# Patient Record
Sex: FEMALE | Race: WHITE | NOT HISPANIC OR LATINO | Employment: FULL TIME | URBAN - METROPOLITAN AREA
[De-identification: names, ages, dates, MRNs, and addresses within clinical notes are randomized per-mention and may not be internally consistent; named-entity substitution may affect disease eponyms.]

---

## 2023-02-27 LAB
ALANINE AMINOTRANSFERASE (SGPT) (U/L) IN SER/PLAS: 14 U/L (ref 7–45)
ALBUMIN (G/DL) IN SER/PLAS: 4.3 G/DL (ref 3.4–5)
ALKALINE PHOSPHATASE (U/L) IN SER/PLAS: 55 U/L (ref 33–110)
ANION GAP IN SER/PLAS: 11 MMOL/L (ref 10–20)
ASPARTATE AMINOTRANSFERASE (SGOT) (U/L) IN SER/PLAS: 15 U/L (ref 9–39)
BASOPHILS (10*3/UL) IN BLOOD BY AUTOMATED COUNT: 0.07 X10E9/L (ref 0–0.1)
BASOPHILS/100 LEUKOCYTES IN BLOOD BY AUTOMATED COUNT: 0.8 % (ref 0–2)
BILIRUBIN TOTAL (MG/DL) IN SER/PLAS: 0.4 MG/DL (ref 0–1.2)
CALCIUM (MG/DL) IN SER/PLAS: 9 MG/DL (ref 8.6–10.6)
CARBON DIOXIDE, TOTAL (MMOL/L) IN SER/PLAS: 26 MMOL/L (ref 21–32)
CHLORIDE (MMOL/L) IN SER/PLAS: 109 MMOL/L (ref 98–107)
CORTISOL (UG/DL) IN SERUM: 10.1 UG/DL (ref 2.5–20)
CREATININE (MG/DL) IN SER/PLAS: 0.83 MG/DL (ref 0.5–1.05)
EOSINOPHILS (10*3/UL) IN BLOOD BY AUTOMATED COUNT: 0.34 X10E9/L (ref 0–0.7)
EOSINOPHILS/100 LEUKOCYTES IN BLOOD BY AUTOMATED COUNT: 4.1 % (ref 0–6)
ERYTHROCYTE DISTRIBUTION WIDTH (RATIO) BY AUTOMATED COUNT: 13.1 % (ref 11.5–14.5)
ERYTHROCYTE MEAN CORPUSCULAR HEMOGLOBIN CONCENTRATION (G/DL) BY AUTOMATED: 33 G/DL (ref 32–36)
ERYTHROCYTE MEAN CORPUSCULAR VOLUME (FL) BY AUTOMATED COUNT: 103 FL (ref 80–100)
ERYTHROCYTES (10*6/UL) IN BLOOD BY AUTOMATED COUNT: 4.06 X10E12/L (ref 4–5.2)
GFR FEMALE: 89 ML/MIN/1.73M2
GLUCOSE (MG/DL) IN SER/PLAS: 96 MG/DL (ref 74–99)
HEMATOCRIT (%) IN BLOOD BY AUTOMATED COUNT: 41.8 % (ref 36–46)
HEMOGLOBIN (G/DL) IN BLOOD: 13.8 G/DL (ref 12–16)
IMMATURE GRANULOCYTES/100 LEUKOCYTES IN BLOOD BY AUTOMATED COUNT: 0.4 % (ref 0–0.9)
LACTATE DEHYDROGENASE (U/L) IN SER/PLAS BY LAC->PYR RXN: 136 U/L (ref 84–246)
LEUKOCYTES (10*3/UL) IN BLOOD BY AUTOMATED COUNT: 8.4 X10E9/L (ref 4.4–11.3)
LYMPHOCYTES (10*3/UL) IN BLOOD BY AUTOMATED COUNT: 2.36 X10E9/L (ref 1.2–4.8)
LYMPHOCYTES/100 LEUKOCYTES IN BLOOD BY AUTOMATED COUNT: 28.3 % (ref 13–44)
MONOCYTES (10*3/UL) IN BLOOD BY AUTOMATED COUNT: 0.71 X10E9/L (ref 0.1–1)
MONOCYTES/100 LEUKOCYTES IN BLOOD BY AUTOMATED COUNT: 8.5 % (ref 2–10)
NEUTROPHILS (10*3/UL) IN BLOOD BY AUTOMATED COUNT: 4.84 X10E9/L (ref 1.2–7.7)
NEUTROPHILS/100 LEUKOCYTES IN BLOOD BY AUTOMATED COUNT: 57.9 % (ref 40–80)
NRBC (PER 100 WBCS) BY AUTOMATED COUNT: 0 /100 WBC (ref 0–0)
PLATELETS (10*3/UL) IN BLOOD AUTOMATED COUNT: 292 X10E9/L (ref 150–450)
POTASSIUM (MMOL/L) IN SER/PLAS: 4.9 MMOL/L (ref 3.5–5.3)
PROTEIN TOTAL: 6.4 G/DL (ref 6.4–8.2)
SODIUM (MMOL/L) IN SER/PLAS: 141 MMOL/L (ref 136–145)
THYROTROPIN (MIU/L) IN SER/PLAS BY DETECTION LIMIT <= 0.05 MIU/L: 2.49 MIU/L (ref 0.44–3.98)
UREA NITROGEN (MG/DL) IN SER/PLAS: 16 MG/DL (ref 6–23)

## 2023-03-02 LAB — ADRENOCORTICOTROPIC HORMONE: 10.7 PG/ML (ref 7.2–63.3)

## 2023-08-24 PROBLEM — F41.8 DEPRESSION WITH ANXIETY: Status: ACTIVE | Noted: 2023-08-24

## 2023-08-24 PROBLEM — G47.00 INSOMNIA: Status: ACTIVE | Noted: 2023-08-24

## 2023-08-24 PROBLEM — R55 SYNCOPE: Status: ACTIVE | Noted: 2018-05-29

## 2023-08-24 PROBLEM — M12.819 ROTATOR CUFF ARTHROPATHY: Status: ACTIVE | Noted: 2018-05-29

## 2023-08-24 PROBLEM — F32.9 MDD (MAJOR DEPRESSIVE DISORDER): Status: ACTIVE | Noted: 2023-08-24

## 2023-08-24 PROBLEM — G43.119 MIGRAINE WITH AURA, INTRACTABLE: Status: ACTIVE | Noted: 2023-08-24

## 2023-08-24 PROBLEM — N63.0 BREAST MASS: Status: ACTIVE | Noted: 2022-11-19

## 2023-08-24 PROBLEM — C43.59 MELANOMA OF BACK (MULTI): Status: ACTIVE | Noted: 2021-12-29

## 2023-08-24 PROBLEM — F41.8 DEPRESSION WITH ANXIETY: Status: RESOLVED | Noted: 2023-08-24 | Resolved: 2023-08-24

## 2023-08-24 PROBLEM — M54.2 CERVICALGIA: Status: ACTIVE | Noted: 2019-07-29

## 2023-08-24 PROBLEM — R92.8 ABNORMAL MAMMOGRAM: Status: ACTIVE | Noted: 2022-08-12

## 2023-08-24 PROBLEM — F41.9 ANXIETY: Status: ACTIVE | Noted: 2018-05-29

## 2023-08-24 RX ORDER — MUPIROCIN 20 MG/G
OINTMENT TOPICAL
COMMUNITY
End: 2023-10-03 | Stop reason: ALTCHOICE

## 2023-08-24 RX ORDER — VARENICLINE TARTRATE 0.5 MG/1
1 TABLET, FILM COATED ORAL DAILY
COMMUNITY
End: 2023-10-03

## 2023-08-24 RX ORDER — ATENOLOL 50 MG/1
50 TABLET ORAL DAILY
COMMUNITY
Start: 2023-04-05 | End: 2023-10-03 | Stop reason: ALTCHOICE

## 2023-08-24 RX ORDER — NAPROXEN SODIUM 220 MG
TABLET ORAL
COMMUNITY
End: 2023-10-03 | Stop reason: ALTCHOICE

## 2023-08-24 RX ORDER — LORAZEPAM 0.5 MG/1
0.5 TABLET ORAL EVERY 6 HOURS PRN
COMMUNITY
End: 2023-12-11 | Stop reason: ALTCHOICE

## 2023-08-24 RX ORDER — NICOTINE 7MG/24HR
1 PATCH, TRANSDERMAL 24 HOURS TRANSDERMAL
COMMUNITY
Start: 2022-01-18 | End: 2023-12-11 | Stop reason: ALTCHOICE

## 2023-08-24 RX ORDER — OMEPRAZOLE 40 MG/1
40 CAPSULE, DELAYED RELEASE ORAL
COMMUNITY
Start: 2021-12-17 | End: 2023-12-11 | Stop reason: ALTCHOICE

## 2023-08-24 RX ORDER — MIRTAZAPINE 45 MG/1
45 TABLET, FILM COATED ORAL NIGHTLY
COMMUNITY
End: 2023-10-01 | Stop reason: SDUPTHER

## 2023-08-24 RX ORDER — BUPROPION HYDROCHLORIDE 100 MG/1
100 TABLET ORAL
COMMUNITY
Start: 2021-11-17 | End: 2023-10-03

## 2023-08-24 RX ORDER — TRAMADOL HYDROCHLORIDE 50 MG/1
50 TABLET ORAL EVERY 12 HOURS
COMMUNITY
Start: 2022-01-18 | End: 2023-10-03 | Stop reason: ALTCHOICE

## 2023-08-25 PROBLEM — D22.5 ATYPICAL NEVUS OF BACK: Status: ACTIVE | Noted: 2023-08-25

## 2023-08-25 PROBLEM — R10.11 RIGHT UPPER QUADRANT PAIN: Status: ACTIVE | Noted: 2023-08-25

## 2023-08-25 PROBLEM — C44.92 SCC (SQUAMOUS CELL CARCINOMA): Status: ACTIVE | Noted: 2023-08-25

## 2023-08-29 ENCOUNTER — HOSPITAL ENCOUNTER (OUTPATIENT)
Dept: DATA CONVERSION | Facility: HOSPITAL | Age: 43
Discharge: HOME | End: 2023-08-29
Payer: COMMERCIAL

## 2023-08-29 DIAGNOSIS — Z01.419 ENCOUNTER FOR GYNECOLOGICAL EXAMINATION (GENERAL) (ROUTINE) WITHOUT ABNORMAL FINDINGS: ICD-10-CM

## 2023-08-29 DIAGNOSIS — Z11.51 ENCOUNTER FOR SCREENING FOR HUMAN PAPILLOMAVIRUS (HPV): ICD-10-CM

## 2023-09-18 ENCOUNTER — HOSPITAL ENCOUNTER (OUTPATIENT)
Dept: DATA CONVERSION | Facility: HOSPITAL | Age: 43
Discharge: HOME | End: 2023-09-18
Payer: COMMERCIAL

## 2023-09-18 DIAGNOSIS — N93.9 ABNORMAL UTERINE AND VAGINAL BLEEDING, UNSPECIFIED: ICD-10-CM

## 2023-10-01 DIAGNOSIS — F33.1 MODERATE EPISODE OF RECURRENT MAJOR DEPRESSIVE DISORDER (MULTI): ICD-10-CM

## 2023-10-01 RX ORDER — MIRTAZAPINE 45 MG/1
45 TABLET, FILM COATED ORAL NIGHTLY
Qty: 30 TABLET | Refills: 0 | Status: SHIPPED | OUTPATIENT
Start: 2023-10-01 | End: 2023-11-01 | Stop reason: SDUPTHER

## 2023-10-03 ENCOUNTER — TELEMEDICINE (OUTPATIENT)
Dept: BEHAVIORAL HEALTH | Facility: HOSPITAL | Age: 43
End: 2023-10-03
Payer: COMMERCIAL

## 2023-10-03 DIAGNOSIS — F33.1 MODERATE EPISODE OF RECURRENT MAJOR DEPRESSIVE DISORDER (MULTI): ICD-10-CM

## 2023-10-03 PROCEDURE — 99213 OFFICE O/P EST LOW 20 MIN: CPT | Mod: GT,95 | Performed by: PSYCHIATRY & NEUROLOGY

## 2023-10-03 PROCEDURE — 99213 OFFICE O/P EST LOW 20 MIN: CPT | Performed by: PSYCHIATRY & NEUROLOGY

## 2023-10-03 RX ORDER — LOSARTAN POTASSIUM 100 MG/1
100 TABLET ORAL DAILY
COMMUNITY
Start: 2023-09-05 | End: 2023-12-19 | Stop reason: SDUPTHER

## 2023-10-03 RX ORDER — IBUPROFEN 200 MG
1 TABLET ORAL DAILY
COMMUNITY
Start: 2023-02-22 | End: 2023-12-11 | Stop reason: ALTCHOICE

## 2023-10-03 NOTE — PROGRESS NOTES
Patient  Melina Rivera is a 43 y.o. female, presented for a follow-up for   Chief Complaint   Patient presents with    Anxiety    Depression   .    Patient was seen via virtual visit:   An interactive audio and video telecommunication system which permits real time communications between the patient (at the originating site) and provider (at the distant site) was utilized to provide this telehealth service.   Verbal consent was requested and obtained from Melina Rivera on this date, 10/03/23 for a telehealth visit.       History of presenting Illness:  43 yr old  woman with stage IIIB melanoma. BRAF positive, s/p one year of adjuvant treatment with immunotherapy/Nivolumab, CT scan on 01/03/2023 showing stable changes who is referred to psychiatry for depression and anxiety.      Patient reports that she had ran out of her mirtazapine and was off for a week. She felt much more anxious and depressed, reports that she took lorazepam 0.5mg twice a day for a few days. Now that she is back on miratzapine, she feels much better. Plans on keep on taking her medication. She is sleeping better, eating better and her mood is better. Reports not feeling helpless. Has had intermittent crying but denies crying spontaneously. Her relationship with her siblings is improved. Has been able to communicate with them better. She is able to acknowledge her emotions.     Denies manic or panic symptoms. No psychosis.   Denies gun ownership.       Past Psychiatric History:   Previous therapy: yes  Previous psychiatric treatment and medication trials: yes - wellbutrin, lorazepam, mirtazapine  Previous psychiatric hospitalizations: yes - once in 2011 when had an anger outburst, was admitted at [facility] for a few days.  Previous diagnoses: yes - MDD, PEPE  Previous suicide attempts: no  History of violence: no  Depression screening was performed with standardized tool: Not Screened    Past Medical History  He has no past  medical history on file.  There is no problem list on file for this patient.      Surgical History  He has no past surgical history on file.     Social History  He has no history on file for tobacco use, alcohol use, and drug use.     Allergies  Patient has no allergy information on record.    Review of Systems    Psychiatric ROS - Adult  Anxiety: General Anxiety Disorder (PEPE)PEPE Behaviors: excessive anxiety/worry, irritability, and restlessness  Depression: negative, energy, interest, and sleep increased  Delirium: negative  Psychosis: negative  Danya: negative  Safety Issues: none  Psychiatric ROS Comment: none    Medication:    Current Outpatient Medications:     losartan (Cozaar) 100 mg tablet, Take 1 tablet (100 mg) by mouth once daily., Disp: , Rfl:     nicotine (Nicoderm CQ) 21 mg/24 hr patch, Place 1 patch on the skin once daily. Apply (1) patch daily as directed., Disp: , Rfl:     LORazepam (Ativan) 0.5 mg tablet, Take 1 tablet (0.5 mg) by mouth 2 times a day., Disp: , Rfl:     mirtazapine (Remeron) 45 mg tablet, Take 1 tablet (45 mg) by mouth once daily at bedtime., Disp: 30 tablet, Rfl: 0    nicotine (Nicoderm CQ) 7 mg/24 hr patch, Place 1 patch on the skin once daily., Disp: , Rfl:     omeprazole (PriLOSEC) 40 mg DR capsule, Take 1 capsule (40 mg) by mouth., Disp: , Rfl:        Allergies:  No Known Allergies     Vitals:  There were no vitals taken for this visit.     Mental Status Exam  Appearance: well-groomed.   Build: average.   Demeanor: average.   Eye Contact: average.   Motor Activity: average.   Speech: clear.   Language: Neurologic language is intact.   Fund of Knowledge: intact fund of knowledge.   Delusions: None Reported.   Self Harm: None Reported.   Aggressive: None Reported.   Mood: depressed and anxious.   Affect: full.   Orientation: alert.   Manner: cooperative.   Thought process: goal-directed, logical.   Thought association: displays rational thought process.   Content of thought: As  noted in HPI   Abstract/ Rational Thought: intact   Memory: grossly intact.   Cognition: intact.   Insight: intact.   Judgement: intact.     Psychiatric Risk Assessment  Violence Risk Assessment: none  Acute Risk of Harm to Others is Considered: low   Suicide Risk Assessment: none  Protective Factors against Suicide: adherence to  treatment, employment, hopefulness/future orientation, marriage/partnership, positive family relationships, and Mosque affiliation/spirituality  Acute Risk of Harm to Self is Considered: low    Labs:  No results found for this or any previous visit (from the past 96 hour(s)).     Assessment/Plan       43 yr old  woman with stage IIIB melanoma. BRAF positive, s/p one year of adjuvant treatment with immunotherapy/Nivolumab, CT scan on 01/03/2023 showing stable changes who is referred to psychiatry for depression and anxiety. Presents with sxs c/w MDD with anxious distress in setting of multiple psychoscocial stressors.      She was started on mirtazapine, she is doing better. Has some relief in her depression and anxiety.     PLAN:  1. MDD with anxious distress  - Increase mirtazapine to 30mg PO QHS  - Provided supportive therapy  - No acute safety issues.     f/u in 4 weeks.        I spent 20 minutes in the professional and overall care of this patient.      Medication Consent  Medication Consent: risks, benefits, side effects reviewed for all ordered meds    Santana Trinh MD

## 2023-10-10 DIAGNOSIS — C43.59 MELANOMA OF BACK (MULTI): Primary | ICD-10-CM

## 2023-10-10 DIAGNOSIS — Z92.89 HISTORY OF IMMUNOTHERAPY: ICD-10-CM

## 2023-10-13 ENCOUNTER — APPOINTMENT (OUTPATIENT)
Dept: RADIOLOGY | Facility: CLINIC | Age: 43
End: 2023-10-13
Payer: COMMERCIAL

## 2023-10-13 ENCOUNTER — APPOINTMENT (OUTPATIENT)
Dept: LAB | Facility: CLINIC | Age: 43
End: 2023-10-13
Payer: COMMERCIAL

## 2023-10-18 ENCOUNTER — APPOINTMENT (OUTPATIENT)
Dept: HEMATOLOGY/ONCOLOGY | Facility: CLINIC | Age: 43
End: 2023-10-18
Payer: COMMERCIAL

## 2023-10-27 ENCOUNTER — APPOINTMENT (OUTPATIENT)
Dept: HEMATOLOGY/ONCOLOGY | Facility: CLINIC | Age: 43
End: 2023-10-27
Payer: COMMERCIAL

## 2023-10-31 ENCOUNTER — TELEMEDICINE (OUTPATIENT)
Dept: BEHAVIORAL HEALTH | Facility: HOSPITAL | Age: 43
End: 2023-10-31
Payer: COMMERCIAL

## 2023-10-31 DIAGNOSIS — F33.1 MODERATE EPISODE OF RECURRENT MAJOR DEPRESSIVE DISORDER (MULTI): ICD-10-CM

## 2023-10-31 DIAGNOSIS — F41.9 ANXIETY: ICD-10-CM

## 2023-10-31 DIAGNOSIS — F33.42 RECURRENT MAJOR DEPRESSIVE DISORDER, IN FULL REMISSION (CMS-HCC): ICD-10-CM

## 2023-10-31 PROCEDURE — 99213 OFFICE O/P EST LOW 20 MIN: CPT | Performed by: PSYCHIATRY & NEUROLOGY

## 2023-11-01 ENCOUNTER — LAB (OUTPATIENT)
Dept: LAB | Facility: CLINIC | Age: 43
End: 2023-11-01
Payer: COMMERCIAL

## 2023-11-01 ENCOUNTER — ANCILLARY PROCEDURE (OUTPATIENT)
Dept: RADIOLOGY | Facility: CLINIC | Age: 43
End: 2023-11-01
Payer: COMMERCIAL

## 2023-11-01 DIAGNOSIS — C43.9 MALIGNANT MELANOMA OF SKIN, UNSPECIFIED (MULTI): Primary | ICD-10-CM

## 2023-11-01 DIAGNOSIS — C43.59 MALIGNANT MELANOMA OF OTHER PART OF TRUNK (MULTI): Primary | ICD-10-CM

## 2023-11-01 DIAGNOSIS — C43.59 MELANOMA OF BACK (MULTI): Primary | ICD-10-CM

## 2023-11-01 LAB
CREAT SERPL-MCNC: 0.8 MG/DL (ref 0.6–1.3)
GFR SERPL CREATININE-BSD FRML MDRD: >90 ML/MIN/1.73M*2

## 2023-11-01 PROCEDURE — 74177 CT ABD & PELVIS W/CONTRAST: CPT | Performed by: RADIOLOGY

## 2023-11-01 PROCEDURE — 71260 CT THORAX DX C+: CPT | Performed by: RADIOLOGY

## 2023-11-01 PROCEDURE — 36415 COLL VENOUS BLD VENIPUNCTURE: CPT

## 2023-11-01 PROCEDURE — 74177 CT ABD & PELVIS W/CONTRAST: CPT

## 2023-11-01 PROCEDURE — 82565 ASSAY OF CREATININE: CPT

## 2023-11-01 PROCEDURE — 2550000001 HC RX 255 CONTRASTS: Performed by: INTERNAL MEDICINE

## 2023-11-01 RX ORDER — MIRTAZAPINE 45 MG/1
45 TABLET, FILM COATED ORAL NIGHTLY
Qty: 30 TABLET | Refills: 2 | Status: SHIPPED | OUTPATIENT
Start: 2023-11-01 | End: 2024-01-24 | Stop reason: SDUPTHER

## 2023-11-01 RX ADMIN — IOHEXOL 75 ML: 350 INJECTION, SOLUTION INTRAVENOUS at 11:52

## 2023-11-01 NOTE — PROGRESS NOTES
Patient  Melina Rivera is a 43 y.o. female, presented for   Chief Complaint   Patient presents with    Follow-up    Depression    Anxiety   .    Patient was seen via virtual visit:   An interactive audio and video telecommunication system which permits real time communications between the patient (at the originating site) and provider (at the distant site) was utilized to provide this telehealth service.   Verbal consent was requested and obtained from Melina Rivera on this date, 11/01/23 for a telehealth visit.       History of presenting Illness:   43 yr old  woman with stage IIIB melanoma. BRAF positive, s/p one year of adjuvant treatment with immunotherapy/Nivolumab, CT scan on 01/03/2023 showing stable changes who is referred to psychiatry for depression and anxiety.     She is back on mirtazapine and feeling better. . She is sleeping better, eating better and her mood is better. Reports not feeling helpless. Has had intermittent crying but denies crying spontaneously. Her relationship with her siblings is improved. Has been able to communicate with them better. She is able to acknowledge her emotions.     Denies manic or panic symptoms. No psychosis.   Denies gun ownership.       Past Psychiatric History:   Previous therapy: yes  Previous psychiatric treatment and medication trials: yes - wellbutrin, lorazepam, mirtazapine  Previous psychiatric hospitalizations: yes - once in 2011 when had an anger outburst, was admitted at [facility] for a few days.  Previous diagnoses: yes - MDD, PEPE  Previous suicide attempts: no  History of violence: no  Depression screening was performed with standardized tool: Not Screened    Past Medical History  History reviewed. No pertinent past medical history.    Surgical History  Past Surgical History:   Procedure Laterality Date    OTHER SURGICAL HISTORY  11/29/2022    Knee surgery    OTHER SURGICAL HISTORY  11/29/2022    Excision melanoma        Family  History:  Family History   Problem Relation Name Age of Onset    Other (gist of colon) Mother      Other (cholangiocarcinoma) Father      Other (papillary carcinoma) Brother      Other (pten gene mutation) Brother      Other (b cell lymphona) Mother's Sister      Throat cancer Mother's Brother      Colon cancer Maternal Grandfather      Bilateral breast cancer Paternal Grandmother      Other (myeloid leukemia) Paternal Cousin         Social History:  Social History     Socioeconomic History    Marital status:      Spouse name: Not on file    Number of children: Not on file    Years of education: Not on file    Highest education level: Not on file   Occupational History    Not on file   Tobacco Use    Smoking status: Every Day     Packs/day: 0.50     Years: 15.00     Additional pack years: 0.00     Total pack years: 7.50     Types: Cigarettes    Smokeless tobacco: Never   Substance and Sexual Activity    Alcohol use: Not on file    Drug use: Not on file    Sexual activity: Not on file   Other Topics Concern    Not on file   Social History Narrative    Not on file     Social Determinants of Health     Financial Resource Strain: Not on file   Food Insecurity: Not on file   Transportation Needs: Not on file   Physical Activity: Not on file   Stress: Not on file   Social Connections: Not on file   Intimate Partner Violence: Not on file   Housing Stability: Not on file        Review of Systems  Anxiety: Negative  Depression: negative  Delirium: negative  Psychosis: negative  Danya: negative  Safety Issues: none  Psychiatric ROS Comment: None    Scales:       Medication:  Current Outpatient Medications   Medication Instructions    LORazepam (ATIVAN) 0.5 mg, oral, 2 times daily    losartan (COZAAR) 100 mg, oral, Daily    mirtazapine (REMERON) 45 mg, oral, Nightly    nicotine (Nicoderm CQ) 21 mg/24 hr patch 1 patch, transdermal, Daily, Apply (1) patch daily as directed.    nicotine (Nicoderm CQ) 7 mg/24 hr patch 1  "patch, transdermal, Daily RT    omeprazole (PRILOSEC) 40 mg, oral        Allergies  .No Known Allergies     Vitals:  Visit Vitals  Smoking Status Every Day        Mental Status Exam  General: Appears stated age, dressed appropriately  Appearance: Fair hygiene and grooming.  Attitude: Pleasant  Behavior: Cooperative  Motor Activity: WNL  Speech: Soft, normal rate, rhythm and volume.  Mood: \"good\"  Affect: Congruent  Thought Process: Linear and goal directed  Thought Content: Denies suicidal or homicidal ideas, intent or plans. No IOR or delusions.  Thought Perception: No perceptual abnormalities.  Cognition: Grossly intact  Insight: Intact  Judgement: Intact      Psychiatric Risk Assessment  Violence Risk Assessment: none  Acute Risk of Harm to Others is Considered: low   Suicide Risk Assessment:  and chronic medical illness  Protective Factors against Suicide: child-related concerns/living with children at home < 18 yrs, employment, fear of social disapproval, fear of suicide, hopefulness/future orientation, marriage/partnership, moral objections to suicide, positive family relationships, Protestant affiliation/spirituality, sense of responsibility toward family, social support/connectedness, strong coping skills, and strong therapeutic alliance with provider  Acute Risk of Harm to Self is Considered: low    Labs:  Component      Latest [facility] 4/3/2023   WBC      4.4 - 11.3 x10E9/L 10.8    RBC      4.00 - 5.20 x10E12/L 3.99 (L)    HEMOGLOBIN      12.0 - 16.0 g/dL 13.3    HEMATOCRIT      36.0 - 46.0 % 39.6    MCV      80 - 100 fL 99    MCHC      32.0 - 36.0 g/dL 33.6    Platelets      150 - 450 x10E9/L 275    RED CELL DISTRIBUTION WIDTH      11.5 - 14.5 % 13.2    Neutrophils %      40.0 - 80.0 % 58.8    Immature Granulocytes %, Automated      0.0 - 0.9 % 0.4    Lymphocytes %      13.0 - 44.0 % 31.3    Monocytes %      2.0 - 10.0 % 6.4    Eosinophils %      0.0 - 6.0 % 2.5    Basophils %      0.0 - 2.0 % 0.6  "   Neutrophils Absolute      1.20 - 7.70 x10E9/L 6.39    Lymphocytes Absolute      1.20 - 4.80 x10E9/L 3.39    Monocytes Absolute      0.10 - 1.00 x10E9/L 0.69    Eosinophils Absolute      0.00 - 0.70 x10E9/L 0.27    Basophils Absolute      0.00 - 0.10 x10E9/L 0.06    GLUCOSE      74 - 99 mg/dL 81    SODIUM      136 - 145 mmol/L 141    POTASSIUM      3.5 - 5.3 mmol/L 4.5    CHLORIDE      98 - 107 mmol/L 104    Bicarbonate      21 - 32 mmol/L 28    Anion Gap      10 - 20 mmol/L 14    Blood Urea Nitrogen      6 - 23 mg/dL 17    Creatinine      0.50 - 1.05 mg/dL 0.86    GFR Female      >90 mL/min/1.73m2 85    Calcium      8.6 - 10.6 mg/dL 9.7    Albumin      3.4 - 5.0 g/dL 4.3    Alkaline Phosphatase      33 - 110 U/L 46    Total Protein      6.4 - 8.2 g/dL 6.7    AST      9 - 39 U/L 15    Bilirubin Total      0.0 - 1.2 mg/dL 0.6    ALT      7 - 45 U/L 14    Thyroid Stimulating Hormone      0.44 - 3.98 mIU/L 1.79       Legend:  (L) Low    Diagnosis:  Problem List Items Addressed This Visit          Mental Health    Anxiety    MDD (major depressive disorder)    Relevant Medications    mirtazapine (Remeron) 45 mg tablet        Assessment/Plan   Problem List Items Addressed This Visit          Mental Health    Anxiety    MDD (major depressive disorder)    Relevant Medications    mirtazapine (Remeron) 45 mg tablet       43 yr old  woman with stage IIIB melanoma. BRAF positive, s/p one year of adjuvant treatment with immunotherapy/Nivolumab, CT scan on 01/03/2023 showing stable changes who is referred to psychiatry for depression and anxiety. Presents with sxs c/w MDD with anxious distress in setting of multiple psychoscocial stressors.      She was started on mirtazapine, she is doing better. Has some relief in her depression and anxiety.     PLAN:  1. MDD with anxious distress  - Mirtazapine 45mg PO QHS  - Provided supportive therapy  - No acute safety issues.     f/u in 4-6 weeks.     Medication  Consent  Medication Consent: no medication changes necessary for review    Please schedule a follow-up with your PCP for your ongoing medical problems.    Dr. Trinh is in office on Mon- Thu. Phone calls may not be returned until next day I am in office.   For scheduling questions: [phone]  For other questions: [phone]       For [facility], [employer] is a 24/7 hotline that you can call for assistance: [phone].     Please call 9-1-1 or go to the nearest emergency room if you feel worse or have thoughts of hurting yourself or anyone else, or hearing voices, seeing visions or having new or scary thoughts about people around you.    I spent 20 minutes in the professional and overall care of this patient.    Santana Trinh MD

## 2023-11-03 ENCOUNTER — OFFICE VISIT (OUTPATIENT)
Dept: HEMATOLOGY/ONCOLOGY | Facility: CLINIC | Age: 43
End: 2023-11-03
Payer: COMMERCIAL

## 2023-11-03 VITALS
OXYGEN SATURATION: 95 % | DIASTOLIC BLOOD PRESSURE: 94 MMHG | RESPIRATION RATE: 18 BRPM | WEIGHT: 167.99 LBS | SYSTOLIC BLOOD PRESSURE: 142 MMHG | TEMPERATURE: 98.8 F | HEART RATE: 96 BPM | BODY MASS INDEX: 32.17 KG/M2

## 2023-11-03 DIAGNOSIS — C43.59 MELANOMA OF BACK (MULTI): Primary | ICD-10-CM

## 2023-11-03 PROCEDURE — 99215 OFFICE O/P EST HI 40 MIN: CPT | Performed by: NURSE PRACTITIONER

## 2023-11-03 ASSESSMENT — ENCOUNTER SYMPTOMS
NEUROLOGICAL NEGATIVE: 1
HEMATOLOGIC/LYMPHATIC NEGATIVE: 1
MUSCULOSKELETAL NEGATIVE: 1
RESPIRATORY NEGATIVE: 1
GASTROINTESTINAL NEGATIVE: 1
CARDIOVASCULAR NEGATIVE: 1
EYES NEGATIVE: 1
PSYCHIATRIC NEGATIVE: 1
CONSTITUTIONAL NEGATIVE: 1
ENDOCRINE NEGATIVE: 1

## 2023-11-03 ASSESSMENT — PAIN SCALES - GENERAL: PAINLEVEL: 0-NO PAIN

## 2023-11-03 NOTE — PATIENT INSTRUCTIONS
Mrs. Melina Rivera ,  It was a pleasure talking to you today.    As discussed, we will meet again in 6 months on 05/03/2024. Please have a CT scan and labs prior on 04/29/2024. Please keep your appointments with dermatology and surgical oncology.     Our offices will be reaching out to you to make all the appointments. I am always available at the phone numbers listed below for an earlier appointment should you wish one.    In case of an emergency please dial 911 or report to your nearest Emergency Room.  For all other questions, please do not hesitate to reach out to us at the number listed below.    Thank you for choosing [facility] at [facility].   We appreciate your visit.     MD Jenae Narvaez, ELOY Gregory RN    Sarcoma and Cutaneous Oncology team    Phone: [phone]  Fax: [phone].

## 2023-11-03 NOTE — PROGRESS NOTES
".Patient ID: Melina Rivera is a 43 y.o. female.  Referring Physician: No referring provider defined for this encounter.  Primary Care Provider: No primary care provider on file.     Chief Complaint   Patient presents with    Follow-up        HPI  Referring Surgeon: Dr. Augie Harris  Dermatologist: Dr. Yessica Salgado  Date of first meeting with our team: 01/02/2022     Date of First meeting with surgical team: 01/03/2022  Melanoma type: Cutaneous Melanoma  Location of primary site: Right lower back melanoma  Date of WLE and SLNB: 01/07/2022  Final pathology: [place] Depth- 2.5mm; Ulceration status- none ; LVI- none ; Other high risk features- none  [place] lymph node status: 3/3 positive- 0.7mm largest deposit.  Final Stage: [id number]- Stage IIIB     Mutation status: BRAF positive.  MRI brain with and without contrast: 01/24/2022- unremarkable  Full body PET scan: 01/24/22- Unremarkable.      Summary:  Ms. Rivera is diagnosed with stage IIIB melanoma. BRAF positive. We recommend one year of adjuvant treatment with immunotherapy. She is on Nivolumab 480mg IV every 4 weeks. Started treatment on March 31, 2022. Surveillance CT scan on 7/13/22 picked up  a new 9mm adrenal nodule. We decided to rescan in 3 months to see the evolution of this nodule. CT scan on 10/7/2022 showed that the adrenal nodule is stable.      CT scan on 01/03/2023 showing stable changes.   CT scan on 04/03/2023 showed stable changes.  CT scan 11/01/2023 showing stable changes and AALIYAH.      Treatment History:    Systemic Treatment  1. Nivolumab 480mg (C1- 03/31/22 to 03/01/23)    Subjective   Please refer to \"Notes/Cancer History\" above for complete History of present illness.     Today,    - Presents to clinic alone.  - Feels well. No new issues or concerns.  - No constitutional symptoms.  - Continues to follow with dermatology, surgical oncology and her PCP.  - Hysterectomy 12/21/2023 for AUB.      Review of Systems:   Review of Systems "   Constitutional: Negative.    HENT:  Negative.     Eyes: Negative.    Respiratory: Negative.     Cardiovascular: Negative.    Gastrointestinal: Negative.    Endocrine: Negative.    Genitourinary: Negative.     Musculoskeletal: Negative.    Skin: Negative.    Neurological: Negative.    Hematological: Negative.    Psychiatric/Behavioral: Negative.          Follows with Dr. Trinh. Doing well with Mirtazapine          MEDICAL HISTORY  Melanoma, anxiety, MDD, migraines     FAMILY HISTORY  Family History   Problem Relation Name Age of Onset    Other (gist of colon) Mother      Other (cholangiocarcinoma) Father      Other (papillary carcinoma) Brother      Other (pten gene mutation) Brother      Other (b cell lymphona) Mother's Sister      Throat cancer Mother's Brother      Colon cancer Maternal Grandfather      Bilateral breast cancer Paternal Grandmother      Other (myeloid leukemia) Paternal Cousin         TOBACCO HISTORY  Tobacco Use: High Risk (11/1/2023)    Patient History     Smoking Tobacco Use: Every Day     Smokeless Tobacco Use: Never     Passive Exposure: Not on file       SOCIAL HISTORY  Social Connections: Not on file   Lives with her      Outpatient Medication Profile:  Current Outpatient Medications on File Prior to Visit   Medication Sig Dispense Refill    LORazepam (Ativan) 0.5 mg tablet Take 1 tablet (0.5 mg) by mouth 2 times a day.      losartan (Cozaar) 100 mg tablet Take 1 tablet (100 mg) by mouth once daily.      mirtazapine (Remeron) 45 mg tablet Take 1 tablet (45 mg) by mouth once daily at bedtime. 30 tablet 2    nicotine (Nicoderm CQ) 21 mg/24 hr patch Place 1 patch on the skin once daily. Apply (1) patch daily as directed.      nicotine (Nicoderm CQ) 7 mg/24 hr patch Place 1 patch on the skin once daily.      omeprazole (PriLOSEC) 40 mg DR capsule Take 1 capsule (40 mg) by mouth.      [DISCONTINUED] mirtazapine (Remeron) 45 mg tablet Take 1 tablet (45 mg) by mouth once daily at bedtime. 30  tablet 0     No current facility-administered medications on file prior to visit.         Performance Status:  Asymptomatic     Vitals and Measurements:   BP (!) 142/94 (BP Location: Right arm, Patient Position: Sitting, BP Cuff Size: Adult)   Pulse 96   Temp 37.1 °C (98.8 °F) (Core)   Resp 18   Wt 76.2 kg (167 lb 15.9 oz)   SpO2 95%   BMI 32.17 kg/m²       Physical Exam:   Physical Exam  Constitutional:       Appearance: Normal appearance.   HENT:      Head: Normocephalic and atraumatic.      Nose: Nose normal.      Mouth/Throat:      Mouth: Mucous membranes are moist.      Pharynx: Oropharynx is clear.   Eyes:      Conjunctiva/sclera: Conjunctivae normal.   Cardiovascular:      Rate and Rhythm: Normal rate and regular rhythm.      Pulses: Normal pulses.      Heart sounds: Normal heart sounds.   Pulmonary:      Effort: Pulmonary effort is normal.      Breath sounds: Normal breath sounds.   Abdominal:      General: Bowel sounds are normal.      Palpations: Abdomen is soft.   Musculoskeletal:         General: Normal range of motion.      Cervical back: Neck supple.   Skin:     General: Skin is warm and dry.      Comments: Well healed incision on back without nodules    Neurological:      General: No focal deficit present.      Mental Status: She is alert and oriented to person, place, and time.   Psychiatric:         Mood and Affect: Mood normal.         Behavior: Behavior normal.        Lab Results:  I have reviewed these laboratory results:     Lab Results   Component Value Date    WBC 10.8 04/03/2023    HGB 13.3 04/03/2023    HCT 39.6 04/03/2023    MCV 99 04/03/2023     04/03/2023         Chemistry    Lab Results   Component Value Date/Time     04/03/2023 1410    K 4.5 04/03/2023 1410     04/03/2023 1410    CO2 28 04/03/2023 1410    BUN 17 04/03/2023 1410    CREATININE 0.86 04/03/2023 1410    Lab Results   Component Value Date/Time    CALCIUM 9.7 04/03/2023 1410    ALKPHOS 46 04/03/2023  1410    AST 15 04/03/2023 1410    ALT 14 04/03/2023 1410    BILITOT 0.6 04/03/2023 1410            Lab Results   Component Value Date    TSH 1.79 04/03/2023        Radiology Result:  I have reviewed the latest Imaging in PACS and the findings are noted in this note. I discussed the results of the latest imaging with the patient. All previous imaging were reviewed at the time it was completed. Full records are available in the EMR for review as well.     === 11/01/23 ===    CT CHEST ABDOMEN PELVIS W IV CONTRAST    - Impression -  CHEST:  1.  No new CT evidence of intrathoracic metastatic disease.  2. Stable intrathoracic findings since comparison CT imaging  04/03/2023.    ABDOMEN-PELVIS:  1.  No CT evidence for new subdiaphragmatic metastatic disease.  2. Stable findings since prior comparison CT imaging 04/03/2023      Signed by: Alexis Bailey 11/2/2023 1:55 PM  Dictation workstation:   [id number]    === 01/24/22 ===    MR BRAIN W AND WO CONTRAST    - Impression -  No evidence of intracranial metastatic disease.    I personally reviewed the images/study and I agree with the findings  as stated. This study was interpreted at [place], [place].    CT chest abdomen pelvis w IV contrast    Result Date: 11/2/2023  Impression: CHEST: 1.  No new CT evidence of intrathoracic metastatic disease. 2. Stable intrathoracic findings since comparison CT imaging 04/03/2023.   ABDOMEN-PELVIS: 1.  No CT evidence for new subdiaphragmatic metastatic disease. 2. Stable findings since prior comparison CT imaging 04/03/2023     Signed by: Alexis Bailey 11/2/2023 1:55 PM Dictation workstation:   [id number]        Pathology Results:  I have reviewed the full pathology report recorded in the EMR. The pertinent portions indicating diagnosis are listed here in the note. for details please refer to the full report recorded in the EMR.    Dermatopathology [Jan 14 2022 2:08PM]  ([id number])    Specimens: NODE, RIGHT AXILLARY SENTINEL LYMPH NODE # 1, COUNT = 229 /NODE,  RIGHT AXILLARY SENTINEL LYMPH NODE #2, COUNT =265 /NODE, LEFT INGUINAL SENTINEL LYMPH NODE #1, COUNT = 712 /SKIN, LEFT UPPER BACK SKIN LESION /SKIN, RIGHT LOWER BACK MELANOMA /SKIN, RIGHT BACK INFERIOR STANDING CONE STITCH BRITT, APEX /RIGHT BACK SUPERIOR  STANDING CONE STITCH MARKS, APEX /Received in formalin is a tan-yellow irregularly shaped piece of skin     Name GRETTA MOSQUERA     Accession #:    Pathologist: SHERLEY STRICKLAND MD   Date of Procedure: 1/7/2022   Date Received:  1/10/2022   Date Reported 1/14/2022   Submitting Physician: DEE DEE SAWANT MD   Location: [facility] Other External #     FINAL DIAGNOSIS   A. NODE, RIGHT AXILLARY SENTINEL LYMPH NODE # 1, COUNT = 229, EXCISION:   MICROMETASTATIC  MALIGNANT MELANOMA IN 1/1 LYMPH NODE, SEE NOTE.     Note: Microscopic examination reveals a lymph node. In the subcapsular and parenchymal space there are collections of atypical epithelioid melanocytes that stain with antibodies against Melan-A,  SOX-10, and HMB45. The largest deposit is 0.6 mm in greatest diameter. No extracapsular extension is seen.     B. NODE, RIGHT AXILLARY SENTINEL LYMPH NODE #2, COUNT =265, EXCISION:   METASTATIC MALIGNANT MELANOMA IN 1/1 LYMPH NODE AND TATTOO,SEE  NOTE.     Note: Microscopic examination reveals subcapsular deposits of atypical epithelioid cells that stain with antibodies against Melan-A, SOX-10 and HMB45. All control slides stain appropriately. No extracapsular extension is seen.      C. NODE, LEFT INGUINAL SENTINEL LYMPH NODE #1, COUNT = 712, EXCISION:   METASTATIC MALIGNANT MELANOMA IN 1/1 LYMPH NODE AND TATTOO, SEE NOTE.     Note: Microscopic examination reveals a lymph node with black tattoo pigment. In the subcapsular  and parenchymal space there are collections of atypical epithelioid melanocytes that stain with antibodies against Melan-A, SOX-10, and HMB45. All  control slides stain appropriately. This deposit is 0.7 mm in greatest diameter and no extracapsular extension  is seen.     D. SKIN, LEFT UPPER BACK SKIN LESION, EXCISION:   CHANGES CONSISTENT WITH PREVIOUS PROCEDURE, INKED MARGINS FREE IN PLANES OF   SECTIONS EXAMINED AND TATTOO WITHOUT RESIDUAL ATYPICAL MELANOCYTIC NEOPLASM   SEEN.      E. SKIN, RIGHT LOWER BACK MELANOMA, EXCISION:   CHANGES CONSISTENT WITH PREVIOUS PROCEDURE, INKED MARGINS FREE IN PLANES OF   SECTIONS EXAMINED WITHOUT RESIDUAL ATYPICAL MELANOCYTIC NEOPLASM SEEN.     F. SKIN, RIGHT BACK INFERIOR STANDING  CONE STITCH BRITT, APEX, EXCISION:   UNREMARKABLE SKIN.     G. RIGHT BACK SUPERIOR STANDING CONE STITCH BRITT, APEX, EXCISION:   UNREMARKABLE SKIN.     Electronically Signed Out By SHERLEY STRICKLAND MD/[facility]   By the signature on  this report, the individual or group listed as making the   Final Interpretation/Diagnosis certifies that they have reviewed this case.     Addendum/Procedures:   Special Oncology Report Date Ordered: 2/2/2022 Status: Signed Out   Date  Complete: 2/2/2022   Date Reported: 2/2/2022     Addendum Diagnosis   TEST: Solid Focus Tumor DNA Panel   SPECIMEN: FFPE, Skin, Back, Right, Biopsy, [id number]   DISEASE DIAGNOSIS: Melanoma   Estimated Tumor Content: 30%    COLLECTION DATE: 12/29/2021   RECEIVED DATE: 01/28/2022   REPORT DATE: 02/02/2022     MICROSATELLITE STATUS: Microsatellite Stable (KATHERIN)   DISEASE ASSOCIATED GENOMIC FINDINGS:    BRAF p.V600E ([id number]: [id number])      Assessment and Plan:   Assessment/Plan   Mrs. Melina Rivera is a 43 y.o. female with a diagnosis of stage IIIB melanoma of the back. WLE/SLNB 01/07/2022. Completed one year of adjuvant Nivolumab 03/01/2023. Currently on surveillance. CT 11/01/2023 showing stable changes. Please see the evolution of the case listed above in the cancer history..     The surveillance plan for stage IIIB melanoma involves  1. Clinical exam and history every 6 months for the  first two years and then every 12 months to complete 5 years. RTC 05/03/2024.  2. CT scan every 6 months for first two years and then every 12 months to complete 5 years. Next CT 04/29/2024.  3. Dermatology follow up for skin check. Next visit 11/16/2023.  4. Follow up with Surgical Oncology for Ultrasound of the lymph node basin. Last 06/2023. She is going to schedule another ultrasound today.      Patient can always call me earlier if he notices any changes in his status, or any physician involved in the care feels that the patient needs to be seen by our team..     # Elevated BP   - Following with her PCP.       # Anxiety  - Following with Dr. Trinh.      # Health maintenance  - Follows with PCP for wellness visits and age appropriate cancer screenings.      DISCLAIMER:   In preparing for this visit and writing this note, I reviewed all the previous electronic medical records (labs, imaging and medical charts) of the patient available in the physician portal. Significant findings which helped in decision making are recorded  in this chart.     The plan was discussed with the patient. We gave her ample opportunities to ask questions. All questions were answered to her satisfaction and she verbalized understanding.

## 2023-12-04 RX ORDER — TRAZODONE HYDROCHLORIDE 150 MG/1
150 TABLET ORAL
COMMUNITY
End: 2023-12-11 | Stop reason: ALTCHOICE

## 2023-12-04 RX ORDER — FLUOXETINE HYDROCHLORIDE 20 MG/1
20 CAPSULE ORAL
COMMUNITY
End: 2023-12-11 | Stop reason: ALTCHOICE

## 2023-12-04 RX ORDER — OXYCODONE AND ACETAMINOPHEN 5; 325 MG/1; MG/1
1 TABLET ORAL EVERY 8 HOURS PRN
COMMUNITY
Start: 2013-01-23 | End: 2023-12-11 | Stop reason: ALTCHOICE

## 2023-12-11 ENCOUNTER — APPOINTMENT (OUTPATIENT)
Dept: PREADMISSION TESTING | Facility: HOSPITAL | Age: 43
End: 2023-12-11
Payer: COMMERCIAL

## 2023-12-11 ENCOUNTER — PRE-ADMISSION TESTING (OUTPATIENT)
Dept: PREADMISSION TESTING | Facility: HOSPITAL | Age: 43
End: 2023-12-11
Payer: COMMERCIAL

## 2023-12-11 ENCOUNTER — LAB REQUISITION (OUTPATIENT)
Dept: LAB | Facility: HOSPITAL | Age: 43
End: 2023-12-11
Payer: COMMERCIAL

## 2023-12-11 VITALS
WEIGHT: 173.06 LBS | HEIGHT: 61 IN | OXYGEN SATURATION: 100 % | DIASTOLIC BLOOD PRESSURE: 98 MMHG | BODY MASS INDEX: 32.67 KG/M2 | TEMPERATURE: 97 F | RESPIRATION RATE: 16 BRPM | HEART RATE: 85 BPM | SYSTOLIC BLOOD PRESSURE: 147 MMHG

## 2023-12-11 DIAGNOSIS — N93.9 ABNORMAL UTERINE AND VAGINAL BLEEDING, UNSPECIFIED: ICD-10-CM

## 2023-12-11 DIAGNOSIS — Z01.818 PREOPERATIVE TESTING: Primary | ICD-10-CM

## 2023-12-11 LAB
ABO GROUP (TYPE) IN BLOOD: NORMAL
ANION GAP SERPL CALC-SCNC: 10 MMOL/L
ANTIBODY SCREEN: NORMAL
BUN SERPL-MCNC: 13 MG/DL (ref 8–25)
CALCIUM SERPL-MCNC: 8.7 MG/DL (ref 8.5–10.4)
CHLORIDE SERPL-SCNC: 103 MMOL/L (ref 97–107)
CO2 SERPL-SCNC: 27 MMOL/L (ref 24–31)
CREAT SERPL-MCNC: 1 MG/DL (ref 0.4–1.6)
ERYTHROCYTE [DISTWIDTH] IN BLOOD BY AUTOMATED COUNT: 13.8 % (ref 11.5–14.5)
GFR SERPL CREATININE-BSD FRML MDRD: 71 ML/MIN/1.73M*2
GLUCOSE SERPL-MCNC: 101 MG/DL (ref 65–99)
HCT VFR BLD AUTO: 40.4 % (ref 36–46)
HGB BLD-MCNC: 13.3 G/DL (ref 12–16)
MCH RBC QN AUTO: 33.2 PG (ref 26–34)
MCHC RBC AUTO-ENTMCNC: 32.9 G/DL (ref 32–36)
MCV RBC AUTO: 101 FL (ref 80–100)
NRBC BLD-RTO: 0 /100 WBCS (ref 0–0)
PLATELET # BLD AUTO: 339 X10*3/UL (ref 150–450)
POTASSIUM SERPL-SCNC: 5 MMOL/L (ref 3.4–5.1)
RBC # BLD AUTO: 4.01 X10*6/UL (ref 4–5.2)
RH FACTOR (ANTIGEN D): NORMAL
SODIUM SERPL-SCNC: 140 MMOL/L (ref 133–145)
WBC # BLD AUTO: 9.9 X10*3/UL (ref 4.4–11.3)

## 2023-12-11 PROCEDURE — 86901 BLOOD TYPING SEROLOGIC RH(D): CPT | Mod: OUT | Performed by: OBSTETRICS & GYNECOLOGY

## 2023-12-11 PROCEDURE — 80048 BASIC METABOLIC PNL TOTAL CA: CPT

## 2023-12-11 PROCEDURE — 36415 COLL VENOUS BLD VENIPUNCTURE: CPT

## 2023-12-11 PROCEDURE — 85027 COMPLETE CBC AUTOMATED: CPT

## 2023-12-11 RX ORDER — BISMUTH SUBSALICYLATE 262 MG
1 TABLET,CHEWABLE ORAL DAILY
COMMUNITY

## 2023-12-11 RX ORDER — ACETAMINOPHEN/PYRILAMINE/CAFF 500-15-60
2 TABLET ORAL EVERY 6 HOURS PRN
COMMUNITY
End: 2024-01-10 | Stop reason: ALTCHOICE

## 2023-12-11 ASSESSMENT — DUKE ACTIVITY SCORE INDEX (DASI)
CAN YOU CLIMB A FLIGHT OF STAIRS OR WALK UP A HILL: YES
CAN YOU DO MODERATE WORK AROUND THE HOUSE LIKE VACUUMING, SWEEPING FLOORS OR CARRYING GROCERIES: YES
CAN YOU DO LIGHT WORK AROUND THE HOUSE LIKE DUSTING OR WASHING DISHES: YES
CAN YOU DO HEAVY WORK AROUND THE HOUSE LIKE SCRUBBING FLOORS OR LIFTING AND MOVING HEAVY FURNITURE: YES
CAN YOU WALK A BLOCK OR TWO ON LEVEL GROUND: YES
CAN YOU HAVE SEXUAL RELATIONS: YES
DASI METS SCORE: 9.9
CAN YOU DO YARD WORK LIKE RAKING LEAVES, WEEDING OR PUSHING A MOWER: YES
CAN YOU RUN A SHORT DISTANCE: YES
CAN YOU PARTICIPATE IN MODERATE RECREATIONAL ACTIVITIES LIKE GOLF, BOWLING, DANCING, DOUBLES TENNIS OR THROWING A BASEBALL OR FOOTBALL: YES
TOTAL_SCORE: 58.2
CAN YOU TAKE CARE OF YOURSELF (EAT, DRESS, BATHE, OR USE TOILET): YES
CAN YOU WALK INDOORS, SUCH AS AROUND YOUR HOUSE: YES
CAN YOU PARTICIPATE IN STRENOUS SPORTS LIKE SWIMMING, SINGLES TENNIS, FOOTBALL, BASKETBALL, OR SKIING: YES

## 2023-12-11 ASSESSMENT — PAIN SCALES - GENERAL: PAINLEVEL_OUTOF10: 4

## 2023-12-11 ASSESSMENT — PAIN DESCRIPTION - DESCRIPTORS: DESCRIPTORS: CRAMPING

## 2023-12-11 ASSESSMENT — PAIN - FUNCTIONAL ASSESSMENT: PAIN_FUNCTIONAL_ASSESSMENT: 0-10

## 2023-12-11 NOTE — PREPROCEDURE INSTRUCTIONS
PAT DISCHARGE INSTRUCTIONS    Please call the Same Day Surgery (SDS) Department of the hospital where your procedure will be performed after 2:00 PM the day before your surgery. If you are scheduled on a Monday, or a Tuesday following a Monday holiday, you will need to call on the last business day prior to your surgery.    [facility]  [address] [address], [address]  [phone]    [facility]  [address] [place] [address] [phone]    [facility]  [address]  [place] [id number]  [phone]    Please let your surgeon know if:      You develop any open sores, shingles, burning or painful urination as these may increase your risk of an infection.   You no longer wish to have the surgery.   Any other personal circumstances change that may lead to the need to cancel or defer this surgery-such as being sick or getting admitted to any hospital within one week of your planned procedure.    Your contact details change, such as a change of address or phone number.    Starting now:     Please DO NOT drink alcohol or smoke for 24 hours before surgery. It is well known that quitting smoking can make a huge difference to your health and recovery from surgery. The longer you abstain from smoking, the better your chances of a healthy recovery. If you need help with quitting, call 5-800-QUIT-NOW to be connected to a trained counselor who will discuss the best methods to help you quit.     Before your surgery:    Please stop all supplements 7 days prior to surgery. Or as directed by your surgeon.   Please stop taking NSAID pain medicine such as Advil and Motrin 7 days before surgery.    If you develop any fever, cough, cold, rashes, cuts, scratches, scrapes, urinary symptoms or infection anywhere on your body (including teeth and gums) prior to surgery, please call your surgeon’s office as soon as  possible. This may require treatment to reduce the chance of cancellation on the day of surgery.    The day before your surgery:   DIET- Do not eat any food after MIDNIGHT. May have 10 ounces of CLEAR LIQUIDS until TWO HOURS before your arrival time. This includes water, black tea or coffee (no milk ir cream), apple juice and electrolyte drinks (Gatorade). May chew gum until TWO hours before your surgery time.   Get a good night’s rest.  Use the special soap for bathing if you have been instructed to use one.    Scheduled surgery times may change and you will be notified if this occurs - please check your personal voicemail for any updates.     On the morning of surgery:   Wear comfortable, loose fitting clothes which open in the front. Please do not wear moisturizers, creams, lotions, makeup or perfume.    Please bring with you to surgery:   Photo ID and insurance card   Current list of medicines and allergies   Pacemaker/ Defibrillator/Heart stent cards   CPAP machine and mask    Slings/ splints/ crutches   A copy of your complete advanced directive/DHPOA.    Please do NOT bring with you to surgery:   All jewelry and valuables should be left at home.   Prosthetic devices such as contact lenses, hearing aids, dentures, eyelash extensions, hairpins and body piercings must be removed prior to going in to the surgical suite.    After outpatient surgery:   A responsible adult MUST accompany you at the time of discharge and stay with you for 24 hours after your surgery. You may NOT drive yourself home after surgery.    Do not drive, operate machinery, make critical decisions or do activities that require co-ordination or balance until after a night’s sleep.   Do not drink alcoholic beverages for 24 hours.   Instructions for resuming your medications will be provided by your surgeon.    CALL YOUR DOCTOR AFTER SURGERY IF YOU HAVE:     Chills and/or a fever of 101° F or higher.    Redness, swelling, pus or drainage from  your surgical wound or a bad smell from the wound.    Lightheadedness, fainting or confusion.    Persistent vomiting (throwing up) and are not able to eat or drink for 12 hours.    Three or more loose, watery bowel movements in 24 hours (diarrhea).   Difficulty or pain while urinating( after non-urological surgery)    Pain and swelling in your legs, especially if it is only on one side.    Difficulty breathing or are breathing faster than normal.    Any new concerning symptoms.

## 2023-12-12 ENCOUNTER — TELEMEDICINE (OUTPATIENT)
Dept: BEHAVIORAL HEALTH | Facility: HOSPITAL | Age: 43
End: 2023-12-12
Payer: COMMERCIAL

## 2023-12-12 ENCOUNTER — PREP FOR PROCEDURE (OUTPATIENT)
Dept: OBSTETRICS AND GYNECOLOGY | Facility: HOSPITAL | Age: 43
End: 2023-12-12

## 2023-12-12 DIAGNOSIS — F33.40 RECURRENT MAJOR DEPRESSIVE DISORDER, IN REMISSION (CMS-HCC): ICD-10-CM

## 2023-12-12 DIAGNOSIS — F41.9 ANXIETY: ICD-10-CM

## 2023-12-12 PROCEDURE — 99213 OFFICE O/P EST LOW 20 MIN: CPT | Performed by: PSYCHIATRY & NEUROLOGY

## 2023-12-12 RX ORDER — SODIUM CHLORIDE 9 MG/ML
100 INJECTION, SOLUTION INTRAVENOUS CONTINUOUS
Status: CANCELLED | OUTPATIENT
Start: 2023-12-21

## 2023-12-12 RX ORDER — LORAZEPAM 0.5 MG/1
0.5 TABLET ORAL 2 TIMES DAILY PRN
Qty: 60 TABLET | Refills: 2 | Status: SHIPPED | OUTPATIENT
Start: 2023-12-12 | End: 2024-05-06

## 2023-12-12 RX ORDER — QUETIAPINE FUMARATE 50 MG/1
50 TABLET, FILM COATED ORAL NIGHTLY
Qty: 30 TABLET | Refills: 2 | Status: SHIPPED | OUTPATIENT
Start: 2023-12-12 | End: 2024-02-27 | Stop reason: SDUPTHER

## 2023-12-12 RX ORDER — GABAPENTIN 600 MG/1
600 TABLET ORAL ONCE
Status: CANCELLED | OUTPATIENT
Start: 2023-12-12 | End: 2023-12-12

## 2023-12-12 RX ORDER — CEFAZOLIN SODIUM 2 G/100ML
2 INJECTION, SOLUTION INTRAVENOUS ONCE
Status: CANCELLED | OUTPATIENT
Start: 2023-12-21 | End: 2023-12-12

## 2023-12-12 RX ORDER — ACETAMINOPHEN 325 MG/1
975 TABLET ORAL ONCE
Status: CANCELLED | OUTPATIENT
Start: 2023-12-12 | End: 2023-12-12

## 2023-12-12 NOTE — PROGRESS NOTES
Patient  Melina Rivera is a 43 y.o. female, presented for No chief complaint on file.  .    Patient was seen via virtual visit:   An interactive audio and video telecommunication system which permits real time communications between the patient (at the originating site) and provider (at the distant site) was utilized to provide this telehealth service.   Verbal consent was requested and obtained from Melina Rivera on this date, 12/12/23 for a telehealth visit.       History of presenting Illness:  She is back on mirtazapine was feeling better, now feeling depressed again. Has hysterectomy coming up next week. Has been more anxious, reestless, not able to sleep at night, Only sleing 3-4 hours, unable to concentrate. Has had intermittent crying. Taking lorazepam at night to sleep. Her relationship with her siblings is improved. Has been able to communicate with them better. She is able to acknowledge her emotions.      Denies manic or panic symptoms. No psychosis.   Denies gun ownership.       Past Psychiatric History:   Previous therapy: yes  Previous psychiatric treatment and medication trials: yes - wellbutrin, lorazepam, mirtazapine  Previous psychiatric hospitalizations: yes - once in 2011 when had an anger outburst, was admitted at [facility] for a few days.  Previous diagnoses: yes - MDD, PEPE  Previous suicide attempts: no  History of violence: no  Depression screening was performed with standardized tool: Not Screened    Past Medical History  No past medical history on file.    Surgical History  Past Surgical History:   Procedure Laterality Date    OTHER SURGICAL HISTORY  11/29/2022    Knee surgery    OTHER SURGICAL HISTORY  11/29/2022    Excision melanoma        Family History:  Family History   Problem Relation Name Age of Onset    Other (gist of colon) Mother      Other (cholangiocarcinoma) Father      Other (papillary carcinoma) Brother      Other (pten gene mutation) Brother      Other (b cell  lymphona) Mother's Sister      Throat cancer Mother's Brother      Colon cancer Maternal Grandfather      Bilateral breast cancer Paternal Grandmother      Other (myeloid leukemia) Paternal Cousin         Social History:  Social History     Socioeconomic History    Marital status:      Spouse name: Not on file    Number of children: Not on file    Years of education: Not on file    Highest education level: Not on file   Occupational History    Not on file   Tobacco Use    Smoking status: Every Day     Packs/day: 1.00     Years: 23.00     Additional pack years: 0.00     Total pack years: 23.00     Types: Cigarettes     Start date: 2000    Smokeless tobacco: Never   Vaping Use    Vaping Use: Never used   Substance and Sexual Activity    Alcohol use: Yes     Alcohol/week: 2.0 standard drinks of alcohol     Types: 2 Standard drinks or equivalent per week    Drug use: Not on file    Sexual activity: Not on file   Other Topics Concern    Not on file   Social History Narrative    Not on file     Social Determinants of Health     Financial Resource Strain: Not on file   Food Insecurity: Not on file   Transportation Needs: Not on file   Physical Activity: Not on file   Stress: Not on file   Social Connections: Not on file   Intimate Partner Violence: Not on file   Housing Stability: Not on file        Review of Systems  Anxiety: General Anxiety Disorder (PEPE)PEPE Behaviors: excessive anxiety/worry, difficulty concentrating, easily fatigued, irritability, and sleep disturbance  Depression: anhedonia, concentration, energy, helpless, interest, sleep decreased , and tearfulness  Delirium: negative  Psychosis: negative  Danya: negative  Safety Issues: none  Psychiatric ROS Comment: none    Scales:       Medication:  Current Outpatient Medications   Medication Instructions    acetaminophen-caff-pyrilamine (MidoL Complete) 500-60-15 mg tablet tablet 2 tablets, oral, Every 6 hours PRN    losartan (COZAAR) 100 mg, oral, Daily  "   mirtazapine (REMERON) 45 mg, oral, Nightly    multivitamin tablet 1 tablet, oral, Daily        Allergies  .No Known Allergies     Vitals:  Visit Vitals  LMP 12/04/2023 (Exact Date)   OB Status Having periods   Smoking Status Every Day        Mental Status Exam  General: Appears stated age, dressed appropriately  Appearance: Fair hygiene and grooming.  Attitude: Pleasant  Behavior: Cooperative  Motor Activity: WNL  Speech: Soft, normal rate, rhythm and volume.  Mood: \"depressed\"  Affect: Congruent  Thought Process: Linear and goal directed  Thought Content: Denies suicidal or homicidal ideas, intent or plans. No IOR or delusions.  Thought Perception: No perceptual abnormalities.  Cognition: Grossly intact  Insight: Intact  Judgement: Intact      Psychiatric Risk Assessment  Violence Risk Assessment: none  Acute Risk of Harm to Others is Considered: low   Suicide Risk Assessment:  and chronic medical illness  Protective Factors against Suicide: child-related concerns/living with children at home < 18 yrs, employment, fear of social disapproval, fear of suicide, hopefulness/future orientation, marriage/partnership, moral objections to suicide, positive family relationships, Jain affiliation/spirituality, sense of responsibility toward family, social support/connectedness, strong coping skills, and strong therapeutic alliance with provider  Acute Risk of Harm to Self is Considered: low    Labs:  Component      Latest Ref Rng 12/11/2023   WBC      4.4 - 11.3 x10*3/uL 9.9    nRBC      0.0 - 0.0 /100 WBCs 0.0    RBC      4.00 - 5.20 x10*6/uL 4.01    HEMOGLOBIN      12.0 - 16.0 g/dL 13.3    HEMATOCRIT      36.0 - 46.0 % 40.4    MCV      80 - 100 fL 101 (H)    MCH      26.0 - 34.0 pg 33.2    MCHC      32.0 - 36.0 g/dL 32.9    RED CELL DISTRIBUTION WIDTH      11.5 - 14.5 % 13.8    Platelets      150 - 450 x10*3/uL 339    GLUCOSE      65 - 99 mg/dL 101 (H)    SODIUM      133 - 145 mmol/L 140    POTASSIUM      " 3.4 - 5.1 mmol/L 5.0    CHLORIDE      97 - 107 mmol/L 103    Bicarbonate      24 - 31 mmol/L 27    Blood Urea Nitrogen      8 - 25 mg/dL 13    Creatinine      0.40 - 1.60 mg/dL 1.00    EGFR      >60 mL/min/1.73m*2 71    Calcium      8.5 - 10.4 mg/dL 8.7    Anion Gap      <=19 mmol/L 10       Legend:  (H) High    Diagnosis:  Problem List Items Addressed This Visit    None       Assessment/Plan   Problem List Items Addressed This Visit          Mental Health    MDD (major depressive disorder)       43 yr old  woman with stage IIIB melanoma. BRAF positive, s/p one year of adjuvant treatment with immunotherapy/Nivolumab, CT scan on 01/03/2023 showing stable changes who is referred to psychiatry for depression and anxiety. Presents with sxs c/w MDD with anxious distress in setting of multiple psychoscocial stressors.      She was started on mirtazapine, did well now feeling more anxious and depressed. She has a hysterectomy coming up next week. Discussed to start quetiapine to help with depression, anxiety and sleep.     PLAN:  1. MDD with anxious distress  - Start quetiapine 50mg PO QHS  - Mirtazapine 45mg PO at bedtime  - renew lorazepam 0.5mg PO BID PRN anxiety  - Provided supportive therapy  - No acute safety issues.     f/u in 4-6 weeks.     Medication Consent  Medication Consent: risks, benefits, side effects reviewed for all ordered meds    Please schedule a follow-up with your PCP for your ongoing medical problems.    Dr. Trinh is in office on Mon- Thu. Phone calls may not be returned until next day I am in office.   For scheduling questions: [phone]  For other questions: [phone]       For [facility], [email or web address] is a 24/7 hotline that you can call for assistance: [phone].     Please call 9-1-1 or go to the nearest emergency room if you feel worse or have thoughts of hurting yourself or anyone else, or hearing voices, seeing visions or having new or scary thoughts about  people around you.    I spent 30 minutes in the professional and overall care of this patient.    Santana Trinh MD

## 2023-12-19 ENCOUNTER — TELEPHONE (OUTPATIENT)
Dept: PRIMARY CARE | Facility: CLINIC | Age: 43
End: 2023-12-19
Payer: COMMERCIAL

## 2023-12-19 DIAGNOSIS — I10 HYPERTENSION, UNSPECIFIED TYPE: ICD-10-CM

## 2023-12-19 RX ORDER — LOSARTAN POTASSIUM 100 MG/1
100 TABLET ORAL DAILY
Qty: 30 TABLET | Refills: 0 | Status: SHIPPED | OUTPATIENT
Start: 2023-12-19 | End: 2024-01-10 | Stop reason: SDUPTHER

## 2023-12-20 ENCOUNTER — TELEPHONE (OUTPATIENT)
Dept: PRIMARY CARE | Facility: CLINIC | Age: 43
End: 2023-12-20
Payer: COMMERCIAL

## 2023-12-20 DIAGNOSIS — Z00.00 ROUTINE MEDICAL EXAM: ICD-10-CM

## 2023-12-20 DIAGNOSIS — Z79.899 DRUG THERAPY: ICD-10-CM

## 2023-12-21 ENCOUNTER — HOSPITAL ENCOUNTER (OUTPATIENT)
Facility: HOSPITAL | Age: 43
Setting detail: OUTPATIENT SURGERY
Discharge: HOME | End: 2023-12-21
Attending: OBSTETRICS & GYNECOLOGY | Admitting: OBSTETRICS & GYNECOLOGY
Payer: COMMERCIAL

## 2023-12-21 ENCOUNTER — ANESTHESIA EVENT (OUTPATIENT)
Dept: OPERATING ROOM | Facility: HOSPITAL | Age: 43
End: 2023-12-21
Payer: COMMERCIAL

## 2023-12-21 ENCOUNTER — PHARMACY VISIT (OUTPATIENT)
Dept: PHARMACY | Facility: CLINIC | Age: 43
End: 2023-12-21
Payer: COMMERCIAL

## 2023-12-21 ENCOUNTER — ANESTHESIA (OUTPATIENT)
Dept: OPERATING ROOM | Facility: HOSPITAL | Age: 43
End: 2023-12-21
Payer: COMMERCIAL

## 2023-12-21 VITALS
RESPIRATION RATE: 16 BRPM | SYSTOLIC BLOOD PRESSURE: 147 MMHG | DIASTOLIC BLOOD PRESSURE: 98 MMHG | HEART RATE: 89 BPM | TEMPERATURE: 97.9 F | OXYGEN SATURATION: 97 %

## 2023-12-21 DIAGNOSIS — G89.18 POSTOPERATIVE PAIN: Primary | ICD-10-CM

## 2023-12-21 DIAGNOSIS — N93.9 ABNORMAL UTERINE BLEEDING: ICD-10-CM

## 2023-12-21 PROBLEM — E66.9 OBESITY: Status: ACTIVE | Noted: 2023-12-21

## 2023-12-21 PROBLEM — Z86.69 HISTORY OF MIGRAINE HEADACHES: Status: ACTIVE | Noted: 2023-12-21

## 2023-12-21 PROBLEM — I10 HTN (HYPERTENSION): Status: ACTIVE | Noted: 2023-12-21

## 2023-12-21 LAB — PREGNANCY TEST URINE, POC: NEGATIVE

## 2023-12-21 PROCEDURE — 88307 TISSUE EXAM BY PATHOLOGIST: CPT | Performed by: PATHOLOGY

## 2023-12-21 PROCEDURE — 36415 COLL VENOUS BLD VENIPUNCTURE: CPT | Performed by: OBSTETRICS & GYNECOLOGY

## 2023-12-21 PROCEDURE — 3700000001 HC GENERAL ANESTHESIA TIME - INITIAL BASE CHARGE: Performed by: OBSTETRICS & GYNECOLOGY

## 2023-12-21 PROCEDURE — 99203 OFFICE O/P NEW LOW 30 MIN: CPT

## 2023-12-21 PROCEDURE — 2500000004 HC RX 250 GENERAL PHARMACY W/ HCPCS (ALT 636 FOR OP/ED): Performed by: ANESTHESIOLOGY

## 2023-12-21 PROCEDURE — 7100000001 HC RECOVERY ROOM TIME - INITIAL BASE CHARGE: Performed by: OBSTETRICS & GYNECOLOGY

## 2023-12-21 PROCEDURE — RXMED WILLOW AMBULATORY MEDICATION CHARGE

## 2023-12-21 PROCEDURE — 2720000007 HC OR 272 NO HCPCS: Performed by: OBSTETRICS & GYNECOLOGY

## 2023-12-21 PROCEDURE — 2500000005 HC RX 250 GENERAL PHARMACY W/O HCPCS: Performed by: ANESTHESIOLOGY

## 2023-12-21 PROCEDURE — 2500000004 HC RX 250 GENERAL PHARMACY W/ HCPCS (ALT 636 FOR OP/ED): Performed by: OBSTETRICS & GYNECOLOGY

## 2023-12-21 PROCEDURE — 3700000002 HC GENERAL ANESTHESIA TIME - EACH INCREMENTAL 1 MINUTE: Performed by: OBSTETRICS & GYNECOLOGY

## 2023-12-21 PROCEDURE — 7100000010 HC PHASE TWO TIME - EACH INCREMENTAL 1 MINUTE: Performed by: OBSTETRICS & GYNECOLOGY

## 2023-12-21 PROCEDURE — 2500000001 HC RX 250 WO HCPCS SELF ADMINISTERED DRUGS (ALT 637 FOR MEDICARE OP): Performed by: OBSTETRICS & GYNECOLOGY

## 2023-12-21 PROCEDURE — 7100000002 HC RECOVERY ROOM TIME - EACH INCREMENTAL 1 MINUTE: Performed by: OBSTETRICS & GYNECOLOGY

## 2023-12-21 PROCEDURE — 3600000004 HC OR TIME - INITIAL BASE CHARGE - PROCEDURE LEVEL FOUR: Performed by: OBSTETRICS & GYNECOLOGY

## 2023-12-21 PROCEDURE — 88307 TISSUE EXAM BY PATHOLOGIST: CPT | Mod: TC | Performed by: OBSTETRICS & GYNECOLOGY

## 2023-12-21 PROCEDURE — 81025 URINE PREGNANCY TEST: CPT | Performed by: OBSTETRICS & GYNECOLOGY

## 2023-12-21 PROCEDURE — 3600000009 HC OR TIME - EACH INCREMENTAL 1 MINUTE - PROCEDURE LEVEL FOUR: Performed by: OBSTETRICS & GYNECOLOGY

## 2023-12-21 PROCEDURE — 2500000005 HC RX 250 GENERAL PHARMACY W/O HCPCS: Performed by: OBSTETRICS & GYNECOLOGY

## 2023-12-21 PROCEDURE — 7100000009 HC PHASE TWO TIME - INITIAL BASE CHARGE: Performed by: OBSTETRICS & GYNECOLOGY

## 2023-12-21 RX ORDER — FENTANYL CITRATE 50 UG/ML
50 INJECTION, SOLUTION INTRAMUSCULAR; INTRAVENOUS ONCE
Status: COMPLETED | OUTPATIENT
Start: 2023-12-21 | End: 2023-12-21

## 2023-12-21 RX ORDER — ACETAMINOPHEN 325 MG/1
975 TABLET ORAL ONCE
Status: COMPLETED | OUTPATIENT
Start: 2023-12-21 | End: 2023-12-21

## 2023-12-21 RX ORDER — NEOSTIGMINE METHYLSULFATE 0.5 MG/ML
INJECTION, SOLUTION INTRAVENOUS AS NEEDED
Status: DISCONTINUED | OUTPATIENT
Start: 2023-12-21 | End: 2023-12-21

## 2023-12-21 RX ORDER — ONDANSETRON 4 MG/1
4 TABLET, FILM COATED ORAL EVERY 6 HOURS PRN
Qty: 30 TABLET | Refills: 1 | Status: SHIPPED | OUTPATIENT
Start: 2023-12-21 | End: 2024-01-10 | Stop reason: ALTCHOICE

## 2023-12-21 RX ORDER — KETOROLAC TROMETHAMINE 30 MG/ML
INJECTION, SOLUTION INTRAMUSCULAR; INTRAVENOUS AS NEEDED
Status: DISCONTINUED | OUTPATIENT
Start: 2023-12-21 | End: 2023-12-21

## 2023-12-21 RX ORDER — ACETAMINOPHEN 325 MG/1
650 TABLET ORAL EVERY 6 HOURS PRN
Qty: 30 TABLET | Refills: 1 | Status: SHIPPED | OUTPATIENT
Start: 2023-12-21 | End: 2024-01-10 | Stop reason: ALTCHOICE

## 2023-12-21 RX ORDER — IBUPROFEN 600 MG/1
600 TABLET ORAL EVERY 6 HOURS PRN
Qty: 30 TABLET | Refills: 1 | Status: SHIPPED | OUTPATIENT
Start: 2023-12-21 | End: 2024-01-10 | Stop reason: ALTCHOICE

## 2023-12-21 RX ORDER — GABAPENTIN 300 MG/1
600 CAPSULE ORAL ONCE
Status: COMPLETED | OUTPATIENT
Start: 2023-12-21 | End: 2023-12-21

## 2023-12-21 RX ORDER — SODIUM CHLORIDE, SODIUM LACTATE, POTASSIUM CHLORIDE, CALCIUM CHLORIDE 600; 310; 30; 20 MG/100ML; MG/100ML; MG/100ML; MG/100ML
50 INJECTION, SOLUTION INTRAVENOUS CONTINUOUS
Status: DISCONTINUED | OUTPATIENT
Start: 2023-12-21 | End: 2023-12-21 | Stop reason: HOSPADM

## 2023-12-21 RX ORDER — SODIUM CHLORIDE 9 MG/ML
100 INJECTION, SOLUTION INTRAVENOUS CONTINUOUS
Status: DISCONTINUED | OUTPATIENT
Start: 2023-12-21 | End: 2023-12-21 | Stop reason: HOSPADM

## 2023-12-21 RX ORDER — FENTANYL CITRATE 50 UG/ML
INJECTION, SOLUTION INTRAMUSCULAR; INTRAVENOUS AS NEEDED
Status: DISCONTINUED | OUTPATIENT
Start: 2023-12-21 | End: 2023-12-21

## 2023-12-21 RX ORDER — IPRATROPIUM BROMIDE 0.5 MG/2.5ML
500 SOLUTION RESPIRATORY (INHALATION) ONCE AS NEEDED
Status: DISCONTINUED | OUTPATIENT
Start: 2023-12-21 | End: 2023-12-21 | Stop reason: HOSPADM

## 2023-12-21 RX ORDER — DEXAMETHASONE SODIUM PHOSPHATE 4 MG/ML
INJECTION, SOLUTION INTRA-ARTICULAR; INTRALESIONAL; INTRAMUSCULAR; INTRAVENOUS; SOFT TISSUE AS NEEDED
Status: DISCONTINUED | OUTPATIENT
Start: 2023-12-21 | End: 2023-12-21

## 2023-12-21 RX ORDER — PROPOFOL 10 MG/ML
INJECTION, EMULSION INTRAVENOUS AS NEEDED
Status: DISCONTINUED | OUTPATIENT
Start: 2023-12-21 | End: 2023-12-21

## 2023-12-21 RX ORDER — GLYCOPYRROLATE 0.2 MG/ML
INJECTION INTRAMUSCULAR; INTRAVENOUS AS NEEDED
Status: DISCONTINUED | OUTPATIENT
Start: 2023-12-21 | End: 2023-12-21

## 2023-12-21 RX ORDER — DOCUSATE SODIUM 100 MG/1
100 CAPSULE, LIQUID FILLED ORAL 2 TIMES DAILY
Qty: 30 CAPSULE | Refills: 1 | Status: SHIPPED | OUTPATIENT
Start: 2023-12-21 | End: 2024-01-10 | Stop reason: ALTCHOICE

## 2023-12-21 RX ORDER — MIDAZOLAM HYDROCHLORIDE 1 MG/ML
INJECTION, SOLUTION INTRAMUSCULAR; INTRAVENOUS AS NEEDED
Status: DISCONTINUED | OUTPATIENT
Start: 2023-12-21 | End: 2023-12-21

## 2023-12-21 RX ORDER — OXYCODONE HYDROCHLORIDE 5 MG/1
5 TABLET ORAL EVERY 6 HOURS PRN
Qty: 12 TABLET | Refills: 0 | Status: SHIPPED | OUTPATIENT
Start: 2023-12-21 | End: 2024-01-10 | Stop reason: ALTCHOICE

## 2023-12-21 RX ORDER — SIMETHICONE 125 MG
125 TABLET,CHEWABLE ORAL EVERY 6 HOURS PRN
Qty: 30 TABLET | Refills: 1 | Status: SHIPPED | OUTPATIENT
Start: 2023-12-21 | End: 2024-01-10 | Stop reason: ALTCHOICE

## 2023-12-21 RX ORDER — LABETALOL HYDROCHLORIDE 5 MG/ML
5 INJECTION, SOLUTION INTRAVENOUS ONCE AS NEEDED
Status: COMPLETED | OUTPATIENT
Start: 2023-12-21 | End: 2023-12-21

## 2023-12-21 RX ORDER — ONDANSETRON HYDROCHLORIDE 2 MG/ML
INJECTION, SOLUTION INTRAVENOUS AS NEEDED
Status: DISCONTINUED | OUTPATIENT
Start: 2023-12-21 | End: 2023-12-21

## 2023-12-21 RX ORDER — LIDOCAINE HYDROCHLORIDE 20 MG/ML
INJECTION, SOLUTION INFILTRATION; PERINEURAL AS NEEDED
Status: DISCONTINUED | OUTPATIENT
Start: 2023-12-21 | End: 2023-12-21

## 2023-12-21 RX ORDER — SODIUM CHLORIDE, SODIUM LACTATE, POTASSIUM CHLORIDE, CALCIUM CHLORIDE 600; 310; 30; 20 MG/100ML; MG/100ML; MG/100ML; MG/100ML
100 INJECTION, SOLUTION INTRAVENOUS CONTINUOUS
Status: DISCONTINUED | OUTPATIENT
Start: 2023-12-21 | End: 2023-12-21 | Stop reason: HOSPADM

## 2023-12-21 RX ORDER — ALBUTEROL SULFATE 0.83 MG/ML
2.5 SOLUTION RESPIRATORY (INHALATION) ONCE AS NEEDED
Status: DISCONTINUED | OUTPATIENT
Start: 2023-12-21 | End: 2023-12-21 | Stop reason: HOSPADM

## 2023-12-21 RX ORDER — CEFAZOLIN SODIUM 2 G/100ML
2 INJECTION, SOLUTION INTRAVENOUS ONCE
Status: COMPLETED | OUTPATIENT
Start: 2023-12-21 | End: 2023-12-21

## 2023-12-21 RX ORDER — MIDAZOLAM HYDROCHLORIDE 1 MG/ML
2 INJECTION, SOLUTION INTRAMUSCULAR; INTRAVENOUS ONCE
Status: COMPLETED | OUTPATIENT
Start: 2023-12-21 | End: 2023-12-21

## 2023-12-21 RX ORDER — ROCURONIUM BROMIDE 10 MG/ML
INJECTION, SOLUTION INTRAVENOUS AS NEEDED
Status: DISCONTINUED | OUTPATIENT
Start: 2023-12-21 | End: 2023-12-21

## 2023-12-21 RX ORDER — ONDANSETRON HYDROCHLORIDE 2 MG/ML
4 INJECTION, SOLUTION INTRAVENOUS ONCE AS NEEDED
Status: DISCONTINUED | OUTPATIENT
Start: 2023-12-21 | End: 2023-12-21 | Stop reason: HOSPADM

## 2023-12-21 RX ADMIN — FENTANYL CITRATE 50 MCG: 0.05 INJECTION, SOLUTION INTRAMUSCULAR; INTRAVENOUS at 11:57

## 2023-12-21 RX ADMIN — HYDROMORPHONE HYDROCHLORIDE 0.5 MG: 1 INJECTION, SOLUTION INTRAMUSCULAR; INTRAVENOUS; SUBCUTANEOUS at 11:47

## 2023-12-21 RX ADMIN — PROPOFOL 50 MG: 10 INJECTION, EMULSION INTRAVENOUS at 10:23

## 2023-12-21 RX ADMIN — NEOSTIGMINE METHYLSULFATE 3 MG: 0.5 INJECTION, SOLUTION INTRAVENOUS at 11:06

## 2023-12-21 RX ADMIN — HYDROMORPHONE HYDROCHLORIDE 0.5 MG: 1 INJECTION, SOLUTION INTRAMUSCULAR; INTRAVENOUS; SUBCUTANEOUS at 12:25

## 2023-12-21 RX ADMIN — GLYCOPYRROLATE 0.5 MG: 0.2 INJECTION INTRAMUSCULAR; INTRAVENOUS at 11:06

## 2023-12-21 RX ADMIN — PROPOFOL 150 MG: 10 INJECTION, EMULSION INTRAVENOUS at 10:10

## 2023-12-21 RX ADMIN — DEXAMETHASONE SODIUM PHOSPHATE 8 MG: 4 INJECTION, SOLUTION INTRA-ARTICULAR; INTRALESIONAL; INTRAMUSCULAR; INTRAVENOUS; SOFT TISSUE at 10:23

## 2023-12-21 RX ADMIN — ONDANSETRON HYDROCHLORIDE 4 MG: 2 INJECTION INTRAMUSCULAR; INTRAVENOUS at 11:08

## 2023-12-21 RX ADMIN — CEFAZOLIN SODIUM 2 G: 2 INJECTION, SOLUTION INTRAVENOUS at 10:00

## 2023-12-21 RX ADMIN — LABETALOL HYDROCHLORIDE 5 MG: 5 INJECTION INTRAVENOUS at 12:25

## 2023-12-21 RX ADMIN — MIDAZOLAM HYDROCHLORIDE 2 MG: 1 INJECTION, SOLUTION INTRAMUSCULAR; INTRAVENOUS at 11:56

## 2023-12-21 RX ADMIN — SODIUM CHLORIDE, SODIUM LACTATE, POTASSIUM CHLORIDE, AND CALCIUM CHLORIDE 100 ML/HR: 600; 310; 30; 20 INJECTION, SOLUTION INTRAVENOUS at 12:16

## 2023-12-21 RX ADMIN — ACETAMINOPHEN 975 MG: 325 TABLET ORAL at 08:56

## 2023-12-21 RX ADMIN — ROCURONIUM BROMIDE 50 MG: 10 INJECTION, SOLUTION INTRAVENOUS at 10:11

## 2023-12-21 RX ADMIN — FENTANYL CITRATE 50 MCG: 50 INJECTION, SOLUTION INTRAMUSCULAR; INTRAVENOUS at 11:14

## 2023-12-21 RX ADMIN — SODIUM CHLORIDE, SODIUM LACTATE, POTASSIUM CHLORIDE, AND CALCIUM CHLORIDE 50 ML/HR: 600; 310; 30; 20 INJECTION, SOLUTION INTRAVENOUS at 09:27

## 2023-12-21 RX ADMIN — INDIGOTINDISULFONATE SODIUM 40 MG: 8 INJECTION INTRAVENOUS at 11:03

## 2023-12-21 RX ADMIN — GABAPENTIN 600 MG: 300 CAPSULE ORAL at 08:56

## 2023-12-21 RX ADMIN — MIDAZOLAM 2 MG: 1 INJECTION INTRAMUSCULAR; INTRAVENOUS at 10:00

## 2023-12-21 RX ADMIN — FENTANYL CITRATE 25 MCG: 50 INJECTION, SOLUTION INTRAMUSCULAR; INTRAVENOUS at 11:24

## 2023-12-21 RX ADMIN — FENTANYL CITRATE 25 MCG: 50 INJECTION, SOLUTION INTRAMUSCULAR; INTRAVENOUS at 11:19

## 2023-12-21 RX ADMIN — HYDROMORPHONE HYDROCHLORIDE 0.5 MG: 1 INJECTION, SOLUTION INTRAMUSCULAR; INTRAVENOUS; SUBCUTANEOUS at 11:40

## 2023-12-21 RX ADMIN — KETOROLAC TROMETHAMINE 30 MG: 30 INJECTION, SOLUTION INTRAMUSCULAR at 11:08

## 2023-12-21 RX ADMIN — LIDOCAINE HYDROCHLORIDE 50 MG: 20 INJECTION, SOLUTION INFILTRATION; PERINEURAL at 10:10

## 2023-12-21 RX ADMIN — FENTANYL CITRATE 100 MCG: 50 INJECTION, SOLUTION INTRAMUSCULAR; INTRAVENOUS at 10:10

## 2023-12-21 SDOH — HEALTH STABILITY: MENTAL HEALTH: CURRENT SMOKER: 1

## 2023-12-21 ASSESSMENT — ENCOUNTER SYMPTOMS
NECK PAIN: 0
BRUISES/BLEEDS EASILY: 0
FEVER: 0
ACTIVITY CHANGE: 0
UNEXPECTED WEIGHT CHANGE: 0
VOMITING: 0
DIFFICULTY URINATING: 0
POLYDIPSIA: 0
SHORTNESS OF BREATH: 0
ABDOMINAL PAIN: 0
WOUND: 0
COUGH: 0
HEMATURIA: 0
NAUSEA: 0
HEADACHES: 1
ARTHRALGIAS: 0
SINUS PAIN: 0
EYE PAIN: 0
BLOOD IN STOOL: 0

## 2023-12-21 ASSESSMENT — COLUMBIA-SUICIDE SEVERITY RATING SCALE - C-SSRS
6. HAVE YOU EVER DONE ANYTHING, STARTED TO DO ANYTHING, OR PREPARED TO DO ANYTHING TO END YOUR LIFE?: NO
2. HAVE YOU ACTUALLY HAD ANY THOUGHTS OF KILLING YOURSELF?: NO
1. IN THE PAST MONTH, HAVE YOU WISHED YOU WERE DEAD OR WISHED YOU COULD GO TO SLEEP AND NOT WAKE UP?: NO

## 2023-12-21 ASSESSMENT — PAIN - FUNCTIONAL ASSESSMENT
PAIN_FUNCTIONAL_ASSESSMENT: 0-10

## 2023-12-21 ASSESSMENT — PAIN SCALES - GENERAL
PAINLEVEL_OUTOF10: 0 - NO PAIN
PAINLEVEL_OUTOF10: 10 - WORST POSSIBLE PAIN
PAINLEVEL_OUTOF10: 7
PAINLEVEL_OUTOF10: 5 - MODERATE PAIN
PAINLEVEL_OUTOF10: 10 - WORST POSSIBLE PAIN
PAINLEVEL_OUTOF10: 5 - MODERATE PAIN
PAIN_LEVEL: 3
PAINLEVEL_OUTOF10: 5 - MODERATE PAIN
PAINLEVEL_OUTOF10: 10 - WORST POSSIBLE PAIN

## 2023-12-21 NOTE — ANESTHESIA PROCEDURE NOTES
Airway  Date/Time: 12/21/2023 10:13 AM  Urgency: elective    Airway not difficult    Staffing  Performed: attending   Authorized by: Areli Bryant MD MPH    Performed by: Areli Bryant MD MPH  Patient location during procedure: OR    Indications and Patient Condition  Indications for airway management: anesthesia  Spontaneous Ventilation: absent  Sedation level: deep  Preoxygenated: yes  Patient position: sniffing  Mask difficulty assessment: 2 - vent by mask + OA or adjuvant +/- NMBA  Planned trial extubation    Final Airway Details  Final airway type: endotracheal airway      Successful airway: ETT  Cuffed: yes   Successful intubation technique: direct laryngoscopy  Blade: Pattie  Blade size: #3  ETT size (mm): 7.0  Cormack-Lehane Classification: grade IIa - partial view of glottis  Placement verified by: chest auscultation   Cuff volume (mL): 5  Measured from: gums  ETT to gums (cm): 20  Number of attempts at approach: 2  Ventilation between attempts: BVM    Additional Comments  Paramedic student in to intubate.  Mask vent w/oral AW in place.  1st attempt by student w/my assistance unsuccessful.  I intubated on 2nd attempt - grade IIA view

## 2023-12-21 NOTE — H&P
History Of Present Illness  Melina Rivera is a 43 y.o. female presenting with uterine fibroids and abnormal uterine bleeding where she has a heavy period every 2 weeks. Pt sates she also feels daily pelvic pain that is severe most of the time. She states that she uses a heating pad daily which helps alleviate some pain. Pt receives scans due to her history of melanoma and recently discovered a cyst in her pelvic region as well. Pt denies history of heart attack or stroke.      Past Medical History  Past Medical History:   Diagnosis Date    Anxiety     Depression     Fibroid     Hypertension        Surgical History  Past Surgical History:   Procedure Laterality Date    ANTERIOR CRUCIATE LIGAMENT REPAIR      OTHER SURGICAL HISTORY  11/29/2022    Knee surgery    OTHER SURGICAL HISTORY  11/29/2022    Excision melanoma        Social History  She reports that she has been smoking cigarettes. She started smoking about 23 years ago. She has a 23.00 pack-year smoking history. She has never used smokeless tobacco. She reports current alcohol use of about 2.0 standard drinks of alcohol per week. She reports that she does not use drugs.    Family History  Family History   Problem Relation Name Age of Onset    Other (gist of colon) Mother      Other (cholangiocarcinoma) Father      Other (papillary carcinoma) Brother      Other (pten gene mutation) Brother      Other (b cell lymphona) Mother's Sister      Throat cancer Mother's Brother      Colon cancer Maternal Grandfather      Bilateral breast cancer Paternal Grandmother      Other (myeloid leukemia) Paternal Cousin          Allergies  Patient has no known allergies.    Review of Systems   Constitutional:  Negative for activity change, fever and unexpected weight change.   HENT:  Negative for congestion, mouth sores and sinus pain.    Eyes:  Negative for pain and visual disturbance.   Respiratory:  Negative for cough and shortness of breath.    Cardiovascular:  Negative for  chest pain.   Gastrointestinal:  Negative for abdominal pain, blood in stool, nausea and vomiting.   Endocrine: Negative for polydipsia and polyuria.   Genitourinary:  Positive for menstrual problem, pelvic pain and vaginal bleeding. Negative for difficulty urinating and hematuria.   Musculoskeletal:  Negative for arthralgias and neck pain.   Skin:  Negative for rash and wound.   Allergic/Immunologic: Negative for immunocompromised state.   Neurological:  Positive for headaches.   Hematological:  Does not bruise/bleed easily.   Psychiatric/Behavioral:  Negative for behavioral problems.         Physical Exam  Constitutional:       General: She is not in acute distress.     Appearance: Normal appearance.   HENT:      Head: Normocephalic and atraumatic.      Nose: Nose normal.      Mouth/Throat:      Mouth: Mucous membranes are moist.   Eyes:      Extraocular Movements: Extraocular movements intact.      Conjunctiva/sclera: Conjunctivae normal.   Cardiovascular:      Rate and Rhythm: Normal rate and regular rhythm.      Heart sounds: Normal heart sounds. No murmur heard.  Pulmonary:      Effort: Pulmonary effort is normal.      Breath sounds: Normal breath sounds.   Abdominal:      General: Abdomen is flat.      Palpations: Abdomen is soft.      Tenderness: There is no abdominal tenderness.   Musculoskeletal:         General: No swelling or signs of injury. Normal range of motion.      Cervical back: Normal range of motion.   Skin:     General: Skin is warm and dry.   Neurological:      General: No focal deficit present.      Mental Status: She is alert and oriented to person, place, and time.   Psychiatric:         Mood and Affect: Mood normal.         Behavior: Behavior normal.          Last Recorded Vitals  Blood pressure 142/87, pulse 89, temperature 36.2 °C (97.2 °F), temperature source Temporal, resp. rate 18, last menstrual period 12/04/2023, SpO2 96 %.    Relevant Results             Assessment/Plan   Active  Problems:  There are no active Hospital Problems.      Patient is here today for robotic hysterectomy with salpingectomy and cystoscopy with Dr. Boswell       I spent 20 minutes in the professional and overall care of this patient.      Tenzin Dickson PA-C

## 2023-12-21 NOTE — ANESTHESIA PREPROCEDURE EVALUATION
Patient: Melina Rivera    Procedure Information       Date/Time: 12/21/23 0945    Procedures:       Robotic Hysterectomy Laparoscopy with Salpingo-Oophorectomy (Bilateral) - ROBOT ASSIST  1:45 START TIME      Neurolysis Torso    Location: [facility] / [facility]    Surgeons: Naomie Boswell, DO            Relevant Problems   Anesthesia (within normal limits)      Cardiovascular   (+) HTN (hypertension)      Endocrine   (+) Obesity      GI (within normal limits)      /Renal (within normal limits)      Neuro/Psych   (+) Anxiety   (+) History of migraine headaches   (+) MDD (major depressive disorder)      Pulmonary  Smoker, 1 PPD, smoked today      GI/Hepatic (within normal limits)      Hematology (within normal limits)      Musculoskeletal (within normal limits)      Eyes, Ears, Nose, and Throat (within normal limits)      Infectious Disease (within normal limits)      Other  H/o melanoma     Past Surgical History:   Procedure Laterality Date   • ANTERIOR CRUCIATE LIGAMENT REPAIR     • OTHER SURGICAL HISTORY  11/29/2022    Knee surgery   • OTHER SURGICAL HISTORY  11/29/2022    Excision melanoma       Clinical information reviewed:   Tobacco  Allergies  Meds   Med Hx  Surg Hx  OB Status  Fam Hx  Soc   Hx        NPO Detail:  NPO/Void Status  Carbonhydrate Drink Given Prior to Surgery? : N  Date of Last Liquid: 12/21/23  Time of Last Liquid: 0615 (SIP OF H2O)  Date of Last Solid: 12/20/23  Time of Last Solid: 1900  Last Intake Type: Clear fluids  Time of Last Void: 0840        Chemistry    Lab Results   Component Value Date/Time     12/11/2023 0833    K 5.0 12/11/2023 0833     12/11/2023 0833    CO2 27 12/11/2023 0833    BUN 13 12/11/2023 0833    CREATININE 1.00 12/11/2023 0833    Lab Results   Component Value Date/Time    CALCIUM 8.7 12/11/2023 0833    ALKPHOS 46 04/03/2023 1410    AST 15 04/03/2023 1410    ALT 14 04/03/2023 1410    BILITOT 0.6 04/03/2023 1410         Lab Results   Component  Value Date    WBC 9.9 12/11/2023    HGB 13.3 12/11/2023    HCT 40.4 12/11/2023     (H) 12/11/2023     12/11/2023          Physical Exam    Airway  Mallampati: II  TM distance: >3 FB     Cardiovascular - normal exam  Rhythm: regular  Rate: normal     Dental    Pulmonary - normal exam  Breath sounds clear to auscultation     Abdominal - normal exam  (+) obese  Abdomen: soft       Other findings: Missing molars - mult      Anesthesia Plan    ASA 2     general and regional   (TAP block)  The patient is a current smoker.  Patient was previously instructed to abstain from smoking on day of procedure.  Patient smoked on day of procedure.  Education provided regarding risk of obstructive sleep apnea.  intravenous induction   Postoperative administration of opioids is intended.  Trial extubation is planned.  Anesthetic plan and risks discussed with patient.  Use of blood products discussed with patient who consented to blood products.

## 2023-12-21 NOTE — ANESTHESIA POSTPROCEDURE EVALUATION
Patient: Melina Rivera    Procedure Summary       Date: 12/21/23 Room / Location: [facility] / [facility]    Anesthesia Start: 0959 Anesthesia Stop: 1133    Procedures:       Robotic Hysterectomy Laparoscopy with Salpingo-Oophorectomy (Bilateral: Abdomen)      Neurolysis Torso (Abdomen) Diagnosis:       Abnormal uterine bleeding      (ABNORMAL UTERINE BLEEDING)    Surgeons: Naomie Boswell DO Responsible Provider: Aerli Bryant MD MPH    Anesthesia Type: general, regional ASA Status: 2            Anesthesia Type: general, regional    Vitals Value Taken Time   /91 12/21/23 1256   Temp 36 °C (96.8 °F) 12/21/23 1130   Pulse 79 12/21/23 1256   Resp 16 12/21/23 1256   SpO2 96 % 12/21/23 1256   Vitals shown include unvalidated device data.    Anesthesia Post Evaluation    Patient location during evaluation: PACU  Patient participation: complete - patient participated  Level of consciousness: awake and alert  Pain score: 3  Pain management: adequate  Multimodal analgesia pain management approach  Airway patency: patent  Two or more strategies used to mitigate risk of obstructive sleep apnea  Cardiovascular status: acceptable  Respiratory status: acceptable  Hydration status: acceptable  Postoperative Nausea and Vomiting: none    No notable events documented.    See above regarding frenulum injury (and also minor swelling of lower lip) first noted by pt on drive home and reported to OR on POD #1 - I d/w pt that she should continue taking PRN pain meds as prescribed for post-op surgical pain and that ibuprofen may be the best choice to help with the swelling.  I explained that pt did have ETT for OR and was also in steep [place] which can result in facial edema which should continue to improve.  Advised pt to go to ER if difficulty breathing or swallowing but otherwised reassured pt that sx should continue to improve.

## 2023-12-21 NOTE — OP NOTE
ROBOTIC ASSISTED TOTAL LAPAROSCOPIC HYSTERECTOMY, BILATERAL SALINGECTOMY, CYSTOSCOPY (ASST'S X 2)  **TAP BLOCK** Operative Note     Date: 2023  OR Location: [facility]    Name: Melina Rivera : 1980, Age: 43 y.o., MRN: [id number], Sex: female    Diagnosis  Pre-op Diagnosis     * Abnormal uterine bleeding [N93.9]  Postoperative Diagnosis  same     Procedures  Robotic Hysterectomy Laparoscopy with Salpingo-Oophorectomy  59317 - CT LAPS TOTAL HYSTERECT 250 GM/< W/RMVL TUBE/OVARY    Neurolysis Torso  26027 - CT CHEMODENERVATION OF TRUNK MUSCLE 1-5 MUSCLES      Surgeons      * Naomie Boswell - Primary    Resident/Fellow/Other Assistant:  Surgeon(s) and Role:    Procedure Summary  Anesthesia: Consult  ASA: II  Anesthesia Staff:   Anesthesiologist: Areli Bryant MD MPH  Estimated Blood Loss: 50CC  Intra-op Medications:   Medication Name Total Dose   ceFAZolin in dextrose (iso-os) (Ancef) IVPB 2 g 2 g   gabapentin (Neurontin) 300 mg capsule  - Omnicell Override Pull 600 mg              Anesthesia Record               Intraprocedure I/O Totals          Intake    LR 1200.00 mL    ceFAZolin in dextrose (iso-os) (Ancef) IVPB 2 g 100.00 mL    Total Intake 1300 mL          Specimen:   ID Type Source Tests Collected by Time   1 : Uterus, cervix, bilateral fallopian tubes and bilateral ovaries Tissue UTERUS, CERVIX, FALLOPIAN TUBES AND OVARIES BILATERAL SURGICAL PATHOLOGY EXAM Naomie Boswell,  10/5/2023 0939        Staff:   Circulator: Joanna Crain RN; Yamilet Marroquin RN  Scrub Person: Sonia Mota; Carol Waddell         Drains and/or Catheters:   Urethral Catheter Non-latex 16 Fr. (Active)       Tourniquet Times:         Implants: none    Findings: Uterus with multiple 2cm fibroids, approximately 8 week size. Normal tubes and ovaries.     Indications: Melina Rivera is an 43 y.o. female who is having surgery for ABNORMAL UTERINE BLEEDING.     The patient was seen in the preoperative area. The  risks, benefits, complications, treatment options, non-operative alternatives, expected recovery and outcomes were discussed with the patient. The possibilities of reaction to medication, pulmonary aspiration, injury to surrounding structures, bleeding, recurrent infection, the need for additional procedures, failure to diagnose a condition, and creating a complication requiring transfusion or operation were discussed with the patient. The patient concurred with the proposed plan, giving informed consent.  The site of surgery was properly noted/marked if necessary per policy. The patient has been actively warmed in preoperative area. Preoperative antibiotics have been ordered and given within 1 hours of incision. Venous thrombosis prophylaxis are not indicated.    Procedure Details: Patient was taken to the operating room and placed in lithotomy position in yellowfin stirrups.  Patient was then prepped and draped in the usual sterile fashion.  A bladder catheter was inserted.  3 cm Marian manipulator was placed in the uterus after sounding the uterus to 8 cm.    Attention was turned to the abdomen where infraumbilical 8 mm incision was created with a scalpel.  An 8 mm robotic trocar was then inserted with laparoscope in place.  Pneumoperitoneum was established with carbon dioxide without difficulty.  Two more 8 mm ports were placed lateral to the umbilicus.  A 12 mm air seal port was then placed in the right upper quadrant.    The robot was then docked without difficulty.  The right tube and ovary were then identified.  Then using the vessel sealer the right fallopian tube was dissected from the mesosalpinx.  This was continued along on to the round ligament and right uteroovarian ligament.  A bladder flap was then started and continued across the anterior portion of the uterus.  The same was repeated on the contralateral side. The left uterine artery was then identified and clamped and ligated using the vessel  sealer.  Same was repeated on the contralateral side.  The vaginal balloon was then inflated and using the monopolar scissors a colpotomy was created.  The uterus was then delivered through the vagina.  Good hemostasis of the vaginal cuff was noted.  The vaginal cuff was then closed using V-Loc sutures.  The pelvis was then irrigated and good hemostasis was confirmed.  All instruments were then removed from the abdomen the robot was undocked and all incisions were closed with 4-0 Vicryl and Dermabond.    A cystoscopy was performed using a 70 degree cystoscope.  Bludigo IV had been given prior to.  Bilateral ureteral orifices were identified with efflux of urine from both. This concluded the procedure alternates but in the neurons were correct.  Complications:  None; patient tolerated the procedure well.    Disposition: PACU - hemodynamically stable.  Condition: stable         Additional Details:     Attending Attestation: I was present and scrubbed for the entire procedure.    Naomie Boswell  Phone Number: [phone]

## 2023-12-21 NOTE — PERIOPERATIVE NURSING NOTE
Timeout for TAP BLOCK done at 1155. Pt supine, 2LNC O2 remains on pt. Lowered HOB for procedure. Versed and Fentanyl given per order. Pt tolerating. Procedure start at 1202. Pt resting quietly. No distress present during procedure. Block ended at 1208. Pt tolerated procedure well.

## 2023-12-21 NOTE — DISCHARGE INSTRUCTIONS
Resume home diet - better to start with smaller more frequent meals at first. Drink plenty of fluids. Stay away from gassy foods at first.  Splint abdomen with pillow or blanket when coughing, sneezing or laughing.  No strenuous actities, no heavy lifting.NO more then 10 lbs till you see Dr. ROBERT No tub baths, no sexual relationships.Do take short walks every 2 hours starting tomorrow and every day living. May shower tomorrow if feel up to it.  Call  over 101F, incisions becomes red-hot-pus-gapping open-bleeding from sites{ for severe abd. Bleeding go to er], constipation, unable to void, severe pain not relieved with pain medication, shortness of breath or chest pain.also sever vaginal bleeding or sever rectal pressure. Urine becomes cloudy ,foul smelling.  Steri strips or glue on incisions will fall of in 2 to 3 weeks- no rubbing, alcohol, or neosporin on incisions. Call Dr. Boswell with any questions or concerns  Office #[phone]

## 2023-12-21 NOTE — ANESTHESIA PROCEDURE NOTES
Peripheral Block    Patient location during procedure: pre-op  Start time: 12/21/2023 12:05 PM  End time: 12/21/2023 12:09 PM  Reason for block: at surgeon's request and post-op pain management  Staffing  Performed: attending   Authorized by: Areli Bryant MD MPH    Performed by: Lee Corea MD  Preanesthetic Checklist  Completed: patient identified, IV checked, site marked, risks and benefits discussed, surgical consent, monitors and equipment checked, pre-op evaluation and timeout performed   Timeout performed at: 12/21/2023 12:05 PM  Peripheral Block  Patient position: laying flat  Prep: ChloraPrep  Patient monitoring: heart rate, cardiac monitor and continuous pulse ox  Block type: TAP  Laterality: B/L  Injection technique: single-shot  Guidance: ultrasound guided  Needle  Needle gauge: 21 G  Needle length: 10 cm  Needle localization: ultrasound guidance  Test dose: negative  Assessment  Injection assessment: negative aspiration for heme, no paresthesia on injection, incremental injection and local visualized surrounding nerve on ultrasound  Paresthesia pain: none  Heart rate change: no  Slow fractionated injection: yes  Additional Notes  Bupivicaine 20 ml 0.5 % on each side

## 2023-12-22 LAB
LABORATORY COMMENT REPORT: NORMAL
PATH REPORT.FINAL DX SPEC: NORMAL
PATH REPORT.GROSS SPEC: NORMAL
PATH REPORT.RELEVANT HX SPEC: NORMAL
PATH REPORT.TOTAL CANCER: NORMAL

## 2024-01-10 ENCOUNTER — OFFICE VISIT (OUTPATIENT)
Dept: PRIMARY CARE | Facility: CLINIC | Age: 44
End: 2024-01-10
Payer: COMMERCIAL

## 2024-01-10 VITALS
HEART RATE: 102 BPM | OXYGEN SATURATION: 98 % | SYSTOLIC BLOOD PRESSURE: 134 MMHG | BODY MASS INDEX: 32.28 KG/M2 | DIASTOLIC BLOOD PRESSURE: 72 MMHG | HEIGHT: 61 IN | WEIGHT: 171 LBS

## 2024-01-10 DIAGNOSIS — I10 PRIMARY HYPERTENSION: ICD-10-CM

## 2024-01-10 DIAGNOSIS — Z00.00 WELL ADULT EXAM: Primary | ICD-10-CM

## 2024-01-10 DIAGNOSIS — I10 HYPERTENSION, UNSPECIFIED TYPE: ICD-10-CM

## 2024-01-10 DIAGNOSIS — Z12.11 SCREEN FOR COLON CANCER: ICD-10-CM

## 2024-01-10 PROBLEM — R92.8 ABNORMAL MAMMOGRAM: Status: RESOLVED | Noted: 2022-08-12 | Resolved: 2024-01-10

## 2024-01-10 PROBLEM — Z86.69 HISTORY OF MIGRAINE HEADACHES: Status: RESOLVED | Noted: 2023-12-21 | Resolved: 2024-01-10

## 2024-01-10 PROBLEM — N63.0 BREAST MASS: Status: RESOLVED | Noted: 2022-11-19 | Resolved: 2024-01-10

## 2024-01-10 PROBLEM — M54.2 CERVICALGIA: Status: RESOLVED | Noted: 2019-07-29 | Resolved: 2024-01-10

## 2024-01-10 PROBLEM — C43.59 MELANOMA OF BACK (MULTI): Status: RESOLVED | Noted: 2021-12-29 | Resolved: 2024-01-10

## 2024-01-10 PROBLEM — C44.92 SCC (SQUAMOUS CELL CARCINOMA): Status: RESOLVED | Noted: 2023-08-25 | Resolved: 2024-01-10

## 2024-01-10 PROBLEM — R10.11 RIGHT UPPER QUADRANT PAIN: Status: RESOLVED | Noted: 2023-08-25 | Resolved: 2024-01-10

## 2024-01-10 PROCEDURE — 3075F SYST BP GE 130 - 139MM HG: CPT | Performed by: FAMILY MEDICINE

## 2024-01-10 PROCEDURE — 90686 IIV4 VACC NO PRSV 0.5 ML IM: CPT | Performed by: FAMILY MEDICINE

## 2024-01-10 PROCEDURE — 90471 IMMUNIZATION ADMIN: CPT | Performed by: FAMILY MEDICINE

## 2024-01-10 PROCEDURE — 99396 PREV VISIT EST AGE 40-64: CPT | Performed by: FAMILY MEDICINE

## 2024-01-10 PROCEDURE — 3078F DIAST BP <80 MM HG: CPT | Performed by: FAMILY MEDICINE

## 2024-01-10 PROCEDURE — 93000 ELECTROCARDIOGRAM COMPLETE: CPT | Performed by: FAMILY MEDICINE

## 2024-01-10 RX ORDER — LOSARTAN POTASSIUM 100 MG/1
100 TABLET ORAL DAILY
Qty: 90 TABLET | Refills: 4 | Status: SHIPPED | OUTPATIENT
Start: 2024-01-10

## 2024-01-10 ASSESSMENT — ENCOUNTER SYMPTOMS
NUMBNESS: 0
FATIGUE: 0
BLOOD IN STOOL: 0
MYALGIAS: 0
UNEXPECTED WEIGHT CHANGE: 0
DIFFICULTY URINATING: 0
FEVER: 0
DYSPHORIC MOOD: 0
ABDOMINAL PAIN: 0
TROUBLE SWALLOWING: 0
SHORTNESS OF BREATH: 0
ARTHRALGIAS: 0
PALPITATIONS: 0
SORE THROAT: 0
HEMATURIA: 0
DIZZINESS: 0
RHINORRHEA: 0
COUGH: 0
NERVOUS/ANXIOUS: 0
ACTIVITY CHANGE: 0

## 2024-01-10 ASSESSMENT — PROMIS GLOBAL HEALTH SCALE
RATE_AVERAGE_PAIN: 4
RATE_QUALITY_OF_LIFE: GOOD
RATE_SOCIAL_SATISFACTION: FAIR
RATE_PHYSICAL_HEALTH: FAIR
EMOTIONAL_PROBLEMS: SOMETIMES
CARRYOUT_PHYSICAL_ACTIVITIES: MOSTLY
RATE_MENTAL_HEALTH: FAIR
RATE_AVERAGE_FATIGUE: MODERATE
CARRYOUT_SOCIAL_ACTIVITIES: FAIR
RATE_GENERAL_HEALTH: GOOD

## 2024-01-10 ASSESSMENT — PATIENT HEALTH QUESTIONNAIRE - PHQ9
1. LITTLE INTEREST OR PLEASURE IN DOING THINGS: NOT AT ALL
SUM OF ALL RESPONSES TO PHQ9 QUESTIONS 1 AND 2: 0
2. FEELING DOWN, DEPRESSED OR HOPELESS: NOT AT ALL

## 2024-01-10 ASSESSMENT — PAIN SCALES - GENERAL: PAINLEVEL: 0-NO PAIN

## 2024-01-10 NOTE — PROGRESS NOTES
"Subjective   Patient ID: Melina Rivera is a 43 y.o. female who presents for Annual Exam.    HPI   Melina is seen for comprehensive physical exam.  PMH, PSH, family history and social history were reviewed and updated.  GYN - hysterectomy for fibroids,   Melanoma - back 2022, seeing dermatologist and oncology ([facility])  PEPE - sees specialist and doing well,    Review of Systems   Constitutional:  Negative for activity change, fatigue, fever and unexpected weight change.   HENT:  Negative for congestion, ear pain, hearing loss, rhinorrhea, sore throat and trouble swallowing.    Eyes:  Negative for visual disturbance.   Respiratory:  Negative for cough and shortness of breath.    Cardiovascular:  Negative for chest pain and palpitations.   Gastrointestinal:  Negative for abdominal pain and blood in stool.   Genitourinary:  Negative for difficulty urinating and hematuria.   Musculoskeletal:  Negative for arthralgias and myalgias.   Skin:  Negative for rash.   Neurological:  Negative for dizziness and numbness.   Psychiatric/Behavioral:  Negative for dysphoric mood. The patient is not nervous/anxious.        Objective   /72   Pulse 102   Ht 1.549 m (5' 1\")   Wt 77.6 kg (171 lb)   LMP 12/21/2023   SpO2 98%   BMI 32.31 kg/m²     Physical Exam  Vitals and nursing note reviewed.   Constitutional:       General: She is not in acute distress.  HENT:      Right Ear: Tympanic membrane and ear canal normal.      Left Ear: Tympanic membrane and ear canal normal.      Nose: Nose normal.      Mouth/Throat:      Mouth: Mucous membranes are moist.      Pharynx: Oropharynx is clear.   Eyes:      Extraocular Movements: Extraocular movements intact.      Pupils: Pupils are equal, round, and reactive to light.   Neck:      Vascular: No carotid bruit.   Cardiovascular:      Rate and Rhythm: Normal rate and regular rhythm.      Pulses: Normal pulses.      Heart sounds: Normal heart sounds.   Pulmonary:      Breath " sounds: Normal breath sounds.   Abdominal:      General: Bowel sounds are normal.      Palpations: Abdomen is soft.      Tenderness: There is no abdominal tenderness.   Musculoskeletal:         General: Normal range of motion.   Skin:     General: Skin is warm.      Findings: No rash.   Neurological:      General: No focal deficit present.      Mental Status: She is alert.   Psychiatric:         Mood and Affect: Mood normal.         Assessment/Plan   Problem List Items Addressed This Visit             ICD-10-CM    HTN (hypertension)  Controlled.  Continue current medication as prescribed.  DASH diet. Exercise at least 4 times per week.    I10    Relevant Medications    losartan (Cozaar) 100 mg tablet    Other Relevant Orders    ECG 12 Lead (Completed)     Other Visit Diagnoses         Codes    Well adult exam    -  Primary  Preventative measures discussed Z00.00    Screen for colon cancer     Z12.11    Relevant Orders    Referral to Gastroenterology

## 2024-01-24 ENCOUNTER — TELEMEDICINE (OUTPATIENT)
Dept: BEHAVIORAL HEALTH | Facility: HOSPITAL | Age: 44
End: 2024-01-24
Payer: COMMERCIAL

## 2024-01-24 DIAGNOSIS — F33.40 RECURRENT MAJOR DEPRESSIVE DISORDER, IN REMISSION (CMS-HCC): ICD-10-CM

## 2024-01-24 DIAGNOSIS — F33.1 MODERATE EPISODE OF RECURRENT MAJOR DEPRESSIVE DISORDER (MULTI): ICD-10-CM

## 2024-01-24 PROCEDURE — 99417 PROLNG OP E/M EACH 15 MIN: CPT | Performed by: PSYCHIATRY & NEUROLOGY

## 2024-01-24 PROCEDURE — 99215 OFFICE O/P EST HI 40 MIN: CPT | Performed by: PSYCHIATRY & NEUROLOGY

## 2024-01-24 RX ORDER — MIRTAZAPINE 45 MG/1
45 TABLET, FILM COATED ORAL NIGHTLY
Qty: 30 TABLET | Refills: 2 | Status: SHIPPED | OUTPATIENT
Start: 2024-01-24 | End: 2024-04-09 | Stop reason: SDUPTHER

## 2024-01-24 NOTE — PROGRESS NOTES
Patient  Melina Rivera is a 43 y.o. female, presented for No chief complaint on file.  .    Patient was seen via virtual visit:   A telephone visit (audio only) between the patient (at the originating site) and the provider (at the distant site) was utilized to provide this telehealth service.   Verbal consent was requested and obtained from Melina Rivera on this date, 01/24/24 for a telehealth visit.       History of presenting Illness:   Patient doing well. Reports that mood has been good. Reports that her anxiety and sleep are much improved. No crying episodes. Had hysterectomy, went well, no issues. Her relationship with her siblings is improved. Has been able to communicate with them better. She is able to acknowledge her emotions.      Not taking lorazepam.    Denies manic or panic symptoms. No psychosis.   Denies gun ownership.       Review of Systems  Anxiety: Negative  Depression: negative  Delirium: negative  Psychosis: negative  Danya: negative  Safety Issues: none  Psychiatric ROS Comment: None      Past Psychiatric History:   Previous therapy: yes  Previous psychiatric treatment and medication trials: yes - wellbutrin, lorazepam, mirtazapine  Previous psychiatric hospitalizations: yes - once in 2011 when had an anger outburst, was admitted at [facility] for a few days.  Previous diagnoses: yes - MDD, PEPE  Previous suicide attempts: no  History of violence: no  Depression screening was performed with standardized tool: Not Screened    Past Medical History  Past Medical History:   Diagnosis Date    Anxiety     Depression     Fibroid     Hypertension     Melanoma of back (CMS/HCC) 12/29/2021    SCC (squamous cell carcinoma) 08/25/2023    SCC       Surgical History  Past Surgical History:   Procedure Laterality Date    ANTERIOR CRUCIATE LIGAMENT REPAIR      HYSTERECTOMY  12/21/2023    OTHER SURGICAL HISTORY  11/29/2022    Knee surgery    OTHER SURGICAL HISTORY  11/29/2022    Excision melanoma         Family History:  Family History   Problem Relation Name Age of Onset    Other (gist of colon) Mother      Other (cholangiocarcinoma) Father      Other (papillary carcinoma) Brother      Other (pten gene mutation) Brother      Other (b cell lymphona) Mother's Sister      Throat cancer Mother's Brother      Colon cancer Maternal Grandfather Don     Bilateral breast cancer Paternal Grandmother      Other (myeloid leukemia) Paternal Cousin         Social History:  Social History     Socioeconomic History    Marital status:      Spouse name: Not on file    Number of children: Not on file    Years of education: Not on file    Highest education level: Not on file   Occupational History    Not on file   Tobacco Use    Smoking status: Every Day     Packs/day: 1.00     Years: 23.00     Additional pack years: 0.00     Total pack years: 23.00     Types: Cigarettes     Start date: 1/1/2000    Smokeless tobacco: Never   Vaping Use    Vaping Use: Never used   Substance and Sexual Activity    Alcohol use: Yes     Alcohol/week: 2.0 standard drinks of alcohol     Types: 2 Standard drinks or equivalent per week    Drug use: Never    Sexual activity: Yes     Partners: Male     Birth control/protection: None   Other Topics Concern    Not on file   Social History Narrative    Not on file     Social Determinants of Health     Financial Resource Strain: Not on file   Food Insecurity: Not on file   Transportation Needs: Not on file   Physical Activity: Not on file   Stress: Not on file   Social Connections: Not on file   Intimate Partner Violence: Not on file   Housing Stability: Not on file            Scales:       Medication:  Current Outpatient Medications   Medication Instructions    LORazepam (ATIVAN) 0.5 mg, oral, 2 times daily PRN    losartan (COZAAR) 100 mg, oral, Daily    mirtazapine (REMERON) 45 mg, oral, Nightly    multivitamin tablet 1 tablet, oral, Daily    QUEtiapine (SEROQUEL) 50 mg, oral, Nightly     "    Allergies  .No Known Allergies     Vitals:  Visit Vitals  LMP 12/21/2023   OB Status Having periods   Smoking Status Every Day        Mental Status Exam  General: Appears stated age, dressed appropriately  Appearance: Fair hygiene and grooming.  Attitude: Pleasant  Behavior: Cooperative  Motor Activity: WNL  Speech: Soft, normal rate, rhythm and volume.  Mood: \"good\"  Affect: Congruent  Thought Process: Linear and goal directed  Thought Content: Denies suicidal or homicidal ideas, intent or plans. No IOR or delusions.  Thought Perception: No perceptual abnormalities.  Cognition: Grossly intact  Insight: Intact  Judgement: Intact      Psychiatric Risk Assessment  Violence Risk Assessment: none  Acute Risk of Harm to Others is Considered: low   Suicide Risk Assessment:  and chronic medical illness  Protective Factors against Suicide: child-related concerns/living with children at home < 18 yrs, employment, fear of social disapproval, fear of suicide, hopefulness/future orientation, marriage/partnership, moral objections to suicide, positive family relationships, Confucianist affiliation/spirituality, sense of responsibility toward family, social support/connectedness, strong coping skills, and strong therapeutic alliance with provider  Acute Risk of Harm to Self is Considered: low      Diagnosis:  Problem List Items Addressed This Visit          Mental Health    MDD (major depressive disorder)        Assessment/Plan   Problem List Items Addressed This Visit          Mental Health    MDD (major depressive disorder)       43 yr old  woman with stage IIIB melanoma. BRAF positive, s/p one year of adjuvant treatment with immunotherapy/Nivolumab, CT scan on 01/03/2023 showing stable changes who is referred to psychiatry for depression and anxiety. Presents with sxs c/w MDD with anxious distress in setting of multiple psychoscocial stressors.      She was started on mirtazapine, did well now feeling more " anxious and depressed. She has a hysterectomy coming up next week. Discussed to start quetiapine to help with depression, anxiety and sleep.     PLAN:  1. MDD with anxious distress  - Start quetiapine 50mg PO QHS  - Mirtazapine 45mg PO at bedtime  - renew lorazepam 0.5mg PO BID PRN anxiety  - Provided supportive therapy  - No acute safety issues.     f/u in 4-6 weeks.     Medication Consent  Medication Consent: no medication changes necessary for review    Please schedule a follow-up with your PCP for your ongoing medical problems.    Dr. Trinh is in office on Mon- Thu. Phone calls may not be returned until next day I am in office.   For scheduling questions: [phone]  For other questions: [phone]       For [facility], [employer] is a 24/7 hotline that you can call for assistance: [phone].     Please call 9-1-1 or go to the nearest emergency room if you feel worse or have thoughts of hurting yourself or anyone else, or hearing voices, seeing visions or having new or scary thoughts about people around you.    I spent 30 minutes in the professional and overall care of this patient.    Santana Trinh MD

## 2024-02-27 ENCOUNTER — TELEMEDICINE (OUTPATIENT)
Dept: BEHAVIORAL HEALTH | Facility: HOSPITAL | Age: 44
End: 2024-02-27
Payer: COMMERCIAL

## 2024-02-27 DIAGNOSIS — F33.40 RECURRENT MAJOR DEPRESSIVE DISORDER, IN REMISSION (CMS-HCC): ICD-10-CM

## 2024-02-27 DIAGNOSIS — F33.1 MODERATE EPISODE OF RECURRENT MAJOR DEPRESSIVE DISORDER (MULTI): ICD-10-CM

## 2024-02-27 PROCEDURE — 99213 OFFICE O/P EST LOW 20 MIN: CPT | Performed by: PSYCHIATRY & NEUROLOGY

## 2024-02-27 RX ORDER — QUETIAPINE FUMARATE 50 MG/1
50 TABLET, FILM COATED ORAL NIGHTLY
Qty: 30 TABLET | Refills: 2 | Status: SHIPPED | OUTPATIENT
Start: 2024-02-27 | End: 2024-05-27

## 2024-02-27 NOTE — PROGRESS NOTES
Patient  Melina Rivera is a 44 y.o. female, presented for No chief complaint on file.  .    Patient was seen via virtual visit:   An interactive audio and video telecommunication system which permits real time communications between the patient (at the originating site) and provider (at the distant site) was utilized to provide this telehealth service.   Verbal consent was requested and obtained from Melina Rivera on this date, 02/27/24 for a telehealth visit.       History of presenting Illness:    Patient doing well. Reports that mood has been good. Reports that her anxiety and sleep are much improved. No crying episodes. Had hysterectomy, went well, no issues. Her relationship with her siblings is improved. Has been able to communicate with them better. She is able to acknowledge her emotions.       Not taking lorazepam.     Denies manic or panic symptoms. No psychosis.   Denies gun ownership.       Review of Systems  Anxiety: Negative  Depression: negative  Delirium: negative  Psychosis: negative  Danya: negative  Safety Issues: none  Psychiatric ROS Comment: None        Past Psychiatric History:   Previous therapy: yes  Previous psychiatric treatment and medication trials: yes - wellbutrin, lorazepam, mirtazapine  Previous psychiatric hospitalizations: yes - once in 2011 when had an anger outburst, was admitted at [facility] for a few days.  Previous diagnoses: yes - MDD, PEPE  Previous suicide attempts: no  History of violence: no  Depression screening was performed with standardized tool: Not Screened    Past Medical History  Past Medical History:   Diagnosis Date    Anxiety     Depression     Fibroid     Hypertension     Melanoma of back (CMS/HCC) 12/29/2021    SCC (squamous cell carcinoma) 08/25/2023    SCC       Surgical History  Past Surgical History:   Procedure Laterality Date    ANTERIOR CRUCIATE LIGAMENT REPAIR      HYSTERECTOMY  12/21/2023    OTHER SURGICAL HISTORY  11/29/2022    Knee  surgery    OTHER SURGICAL HISTORY  11/29/2022    Excision melanoma        Family History:  Family History   Problem Relation Name Age of Onset    Other (gist of colon) Mother      Other (cholangiocarcinoma) Father      Other (papillary carcinoma) Brother      Other (pten gene mutation) Brother      Other (b cell lymphona) Mother's Sister      Throat cancer Mother's Brother      Colon cancer Maternal Grandfather Don     Bilateral breast cancer Paternal Grandmother      Other (myeloid leukemia) Paternal Cousin         Social History:  Social History     Socioeconomic History    Marital status:      Spouse name: Not on file    Number of children: Not on file    Years of education: Not on file    Highest education level: Not on file   Occupational History    Not on file   Tobacco Use    Smoking status: Every Day     Packs/day: 1.00     Years: 23.00     Additional pack years: 0.00     Total pack years: 23.00     Types: Cigarettes     Start date: 1/1/2000    Smokeless tobacco: Never   Vaping Use    Vaping Use: Never used   Substance and Sexual Activity    Alcohol use: Yes     Alcohol/week: 2.0 standard drinks of alcohol     Types: 2 Standard drinks or equivalent per week    Drug use: Never    Sexual activity: Yes     Partners: Male     Birth control/protection: None   Other Topics Concern    Not on file   Social History Narrative    Not on file     Social Determinants of Health     Financial Resource Strain: Not on file   Food Insecurity: Not on file   Transportation Needs: Not on file   Physical Activity: Not on file   Stress: Not on file   Social Connections: Not on file   Intimate Partner Violence: Not on file   Housing Stability: Not on file       Scales:       Medication:  Current Outpatient Medications   Medication Instructions    LORazepam (ATIVAN) 0.5 mg, oral, 2 times daily PRN    losartan (COZAAR) 100 mg, oral, Daily    mirtazapine (REMERON) 45 mg, oral, Nightly    multivitamin tablet 1 tablet, oral,  "Daily    QUEtiapine (SEROQUEL) 50 mg, oral, Nightly        Allergies  .No Known Allergies     Vitals:  Visit Vitals  OB Status Having periods   Smoking Status Every Day        Mental Status Exam  General: Appears stated age, dressed appropriately  Appearance: Fair hygiene and grooming.  Attitude: Pleasant  Behavior: Cooperative  Motor Activity: WNL  Speech: Soft, normal rate, rhythm and volume.  Mood: \"good\"  Affect: Congruent  Thought Process: Linear and goal directed  Thought Content: Denies suicidal or homicidal ideas, intent or plans. No IOR or delusions.  Thought Perception: No perceptual abnormalities.  Cognition: Grossly intact  Insight: Intact  Judgement: Intact        Psychiatric Risk Assessment  Violence Risk Assessment: none  Acute Risk of Harm to Others is Considered: low   Suicide Risk Assessment:  and chronic medical illness  Protective Factors against Suicide: child-related concerns/living with children at home < 18 yrs, employment, fear of social disapproval, fear of suicide, hopefulness/future orientation, marriage/partnership, moral objections to suicide, positive family relationships, Buddhist affiliation/spirituality, sense of responsibility toward family, social support/connectedness, strong coping skills, and strong therapeutic alliance with provider  Acute Risk of Harm to Self is Considered: low      Diagnosis:  Problem List Items Addressed This Visit          Mental Health    MDD (major depressive disorder)        Assessment/Plan   Problem List Items Addressed This Visit          Mental Health    MDD (major depressive disorder)       43 yr old  woman with stage IIIB melanoma. BRAF positive, s/p one year of adjuvant treatment with immunotherapy/Nivolumab, CT scan on 01/03/2023 showing stable changes who is referred to psychiatry for depression and anxiety. Presents with sxs c/w MDD with anxious distress in setting of multiple psychoscocial stressors.      She was started on " mirtazapine, did well now feeling more anxious and depressed. She has a hysterectomy coming up next week. Discussed to start quetiapine to help with depression, anxiety and sleep.     PLAN:  1. MDD with anxious distress  - Continue  quetiapine 50mg PO QHS  - Mirtazapine 45mg PO at bedtime  - renew lorazepam 0.5mg PO BID PRN anxiety  - Provided supportive therapy  - No acute safety issues.     f/u in 4-6 weeks.     Medication Consent  Medication Consent: no medication changes necessary for review    Please schedule a follow-up with your PCP for your ongoing medical problems.    Dr. Trinh is in office on Mon- Thu. Phone calls may not be returned until next day I am in office.   For scheduling questions: [phone]  For other questions: [phone]       For [facility], [employer] is a 24/7 hotline that you can call for assistance: [phone].     Please call 9-1-1 or go to the nearest emergency room if you feel worse or have thoughts of hurting yourself or anyone else, or hearing voices, seeing visions or having new or scary thoughts about people around you.    I spent 20 minutes in the professional and overall care of this patient.    Santana Trinh MD

## 2024-04-09 ENCOUNTER — TELEMEDICINE (OUTPATIENT)
Dept: BEHAVIORAL HEALTH | Facility: HOSPITAL | Age: 44
End: 2024-04-09
Payer: COMMERCIAL

## 2024-04-09 DIAGNOSIS — F33.1 MODERATE EPISODE OF RECURRENT MAJOR DEPRESSIVE DISORDER (MULTI): ICD-10-CM

## 2024-04-09 DIAGNOSIS — F33.40 RECURRENT MAJOR DEPRESSIVE DISORDER, IN REMISSION (CMS-HCC): ICD-10-CM

## 2024-04-09 PROCEDURE — 99213 OFFICE O/P EST LOW 20 MIN: CPT | Performed by: PSYCHIATRY & NEUROLOGY

## 2024-04-09 RX ORDER — MIRTAZAPINE 45 MG/1
45 TABLET, FILM COATED ORAL NIGHTLY
Qty: 30 TABLET | Refills: 2 | Status: SHIPPED | OUTPATIENT
Start: 2024-04-09 | End: 2024-07-08

## 2024-04-09 NOTE — PROGRESS NOTES
"Patient  Melina Rivera is a 44 y.o. female, presented for   Chief Complaint   Patient presents with    Anxiety    Follow-up    Depression   .    Patient was seen via virtual visit:   An interactive audio and video telecommunication system which permits real time communications between the patient (at the originating site) and provider (at the distant site) was utilized to provide this telehealth service.   Verbal consent was requested and obtained from Melina Rivera on this date, 04/09/24 for a telehealth visit.       History of presenting Illness:    Patient doing well. Reports that mood has been good. Reports that her anxiety and sleep are much improved. Communicating well with family. \"I feel even, nothing new and dramatic ... I feel steady.\"No crying episodes. Had hysterectomy, went well, no issues. Her relationship with her siblings is improved. Has been able to communicate with them better. She is able to acknowledge her emotions.       Not taking lorazepam.     Denies manic or panic symptoms. No psychosis.   Denies gun ownership.       Review of Systems  Anxiety: Negative  Depression: negative  Delirium: negative  Psychosis: negative  Danya: negative  Safety Issues: none  Psychiatric ROS Comment: None        Past Psychiatric History:   Previous therapy: yes  Previous psychiatric treatment and medication trials: yes - wellbutrin, lorazepam, mirtazapine  Previous psychiatric hospitalizations: yes - once in 2011 when had an anger outburst, was admitted at [facility] for a few days.  Previous diagnoses: yes - MDD, PEPE  Previous suicide attempts: no  History of violence: no  Depression screening was performed with standardized tool: Not Screened    Past Medical History  Past Medical History:   Diagnosis Date    Anxiety     Depression     Fibroid     Hypertension     Melanoma of back (CMS/HCC) 12/29/2021    SCC (squamous cell carcinoma) 08/25/2023    SCC       Surgical History  Past Surgical History: "   Procedure Laterality Date    ANTERIOR CRUCIATE LIGAMENT REPAIR      HYSTERECTOMY  12/21/2023    OTHER SURGICAL HISTORY  11/29/2022    Knee surgery    OTHER SURGICAL HISTORY  11/29/2022    Excision melanoma        Family History:  Family History   Problem Relation Name Age of Onset    Other (gist of colon) Mother      Other (cholangiocarcinoma) Father      Other (papillary carcinoma) Brother      Other (pten gene mutation) Brother      Other (b cell lymphona) Mother's Sister      Throat cancer Mother's Brother      Colon cancer Maternal Grandfather Don     Bilateral breast cancer Paternal Grandmother      Other (myeloid leukemia) Paternal Cousin         Social History:  Social History     Socioeconomic History    Marital status:      Spouse name: Not on file    Number of children: Not on file    Years of education: Not on file    Highest education level: Not on file   Occupational History    Not on file   Tobacco Use    Smoking status: Every Day     Packs/day: 1.00     Years: 23.00     Additional pack years: 0.00     Total pack years: 23.00     Types: Cigarettes     Start date: 1/1/2000    Smokeless tobacco: Never   Vaping Use    Vaping Use: Never used   Substance and Sexual Activity    Alcohol use: Yes     Alcohol/week: 2.0 standard drinks of alcohol     Types: 2 Standard drinks or equivalent per week    Drug use: Never    Sexual activity: Yes     Partners: Male     Birth control/protection: None   Other Topics Concern    Not on file   Social History Narrative    Not on file     Social Determinants of Health     Financial Resource Strain: Not on file   Food Insecurity: Not on file   Transportation Needs: Not on file   Physical Activity: Not on file   Stress: Not on file   Social Connections: Not on file   Intimate Partner Violence: Not on file   Housing Stability: Not on file          Medication:  Current Outpatient Medications   Medication Instructions    LORazepam (ATIVAN) 0.5 mg, oral, 2 times daily PRN  "   losartan (COZAAR) 100 mg, oral, Daily    mirtazapine (REMERON) 45 mg, oral, Nightly    multivitamin tablet 1 tablet, oral, Daily    QUEtiapine (SEROQUEL) 50 mg, oral, Nightly        Allergies  .No Known Allergies     Vitals:  Visit Vitals  OB Status Having periods   Smoking Status Every Day        Mental Status Exam  General: Appears stated age, dressed appropriately  Appearance: Fair hygiene and grooming.  Attitude: Pleasant  Behavior: Cooperative  Motor Activity: WNL  Speech: Soft, normal rate, rhythm and volume.  Mood: \"good\"  Affect: Congruent  Thought Process: Linear and goal directed  Thought Content: Denies suicidal or homicidal ideas, intent or plans. No IOR or delusions.  Thought Perception: No perceptual abnormalities.  Cognition: Grossly intact  Insight: Intact  Judgement: Intact      Psychiatric Risk Assessment  Violence Risk Assessment: none  Acute Risk of Harm to Others is Considered: low   Suicide Risk Assessment:  and chronic medical illness  Protective Factors against Suicide: child-related concerns/living with children at home < 18 yrs, employment, fear of social disapproval, fear of suicide, hopefulness/future orientation, marriage/partnership, moral objections to suicide, positive family relationships, Latter-day affiliation/spirituality, sense of responsibility toward family, social support/connectedness, strong coping skills, and strong therapeutic alliance with provider  Acute Risk of Harm to Self is Considered: low    Labs:  Component      Latest Ref [facility] 12/11/2023   WBC      4.4 - 11.3 x10*3/uL 9.9    nRBC      0.0 - 0.0 /100 WBCs 0.0    RBC      4.00 - 5.20 x10*6/uL 4.01    HEMOGLOBIN      12.0 - 16.0 g/dL 13.3    HEMATOCRIT      36.0 - 46.0 % 40.4    MCV      80 - 100 fL 101 (H)    MCH      26.0 - 34.0 pg 33.2    MCHC      32.0 - 36.0 g/dL 32.9    RED CELL DISTRIBUTION WIDTH      11.5 - 14.5 % 13.8    Platelets      150 - 450 x10*3/uL 339    GLUCOSE      65 - 99 mg/dL 101 (H)  "   SODIUM      133 - 145 mmol/L 140    POTASSIUM      3.4 - 5.1 mmol/L 5.0    CHLORIDE      97 - 107 mmol/L 103    Bicarbonate      24 - 31 mmol/L 27    Blood Urea Nitrogen      8 - 25 mg/dL 13    Creatinine      0.40 - 1.60 mg/dL 1.00    EGFR      >60 mL/min/1.73m*2 71    Calcium      8.5 - 10.4 mg/dL 8.7    Anion Gap      <=19 mmol/L 10       Legend:  (H) High    Diagnosis:  Problem List Items Addressed This Visit          Mental Health    MDD (major depressive disorder)        Assessment/Plan   Problem List Items Addressed This Visit          Mental Health    MDD (major depressive disorder)       44 yr old  woman with stage IIIB melanoma. BRAF positive, s/p one year of adjuvant treatment with immunotherapy/Nivolumab, CT scan on 01/03/2023 showing stable changes who is referred to psychiatry for depression and anxiety. Presents with sxs c/w MDD with anxious distress in setting of multiple psychoscocial stressors.      She was started on mirtazapine, did well. She is to continue  quetiapine to help with depression, anxiety and sleep. Has not taken any lorazepam since last visit.     PLAN:  1. MDD with anxious distress  - Continue  quetiapine 50mg PO QHS  - Mirtazapine 45mg PO at bedtime  - renew lorazepam 0.5mg PO BID PRN anxiety  - Provided supportive therapy  - No acute safety issues.       Medication Consent  Medication Consent: no medication changes necessary for review    Please schedule a follow-up with your PCP for your ongoing medical problems.    Dr. Trinh is in office on Mon- Thu. Phone calls may not be returned until next day I am in office.   For scheduling questions: [phone]  For other questions: [phone]       For [facility], [employer] is a 24/7 hotline that you can call for assistance: [phone].     Please call 9-1-1 or go to the nearest emergency room if you feel worse or have thoughts of hurting yourself or anyone else, or hearing voices, seeing visions or  having new or scary thoughts about people around you.    I spent 30 minutes in the professional and overall care of this patient.    Santana Trinh MD

## 2024-04-26 ENCOUNTER — LAB (OUTPATIENT)
Dept: LAB | Facility: LAB | Age: 44
End: 2024-04-26
Payer: COMMERCIAL

## 2024-04-26 DIAGNOSIS — C43.59 MELANOMA OF BACK (MULTI): ICD-10-CM

## 2024-04-26 PROCEDURE — 85025 COMPLETE CBC W/AUTO DIFF WBC: CPT

## 2024-04-26 PROCEDURE — 80053 COMPREHEN METABOLIC PANEL: CPT

## 2024-04-26 PROCEDURE — 36415 COLL VENOUS BLD VENIPUNCTURE: CPT

## 2024-04-26 PROCEDURE — 83615 LACTATE (LD) (LDH) ENZYME: CPT

## 2024-04-27 LAB
ALBUMIN SERPL BCP-MCNC: 4.4 G/DL (ref 3.4–5)
ALP SERPL-CCNC: 61 U/L (ref 33–110)
ALT SERPL W P-5'-P-CCNC: 34 U/L (ref 7–45)
ANION GAP SERPL CALC-SCNC: 14 MMOL/L (ref 10–20)
AST SERPL W P-5'-P-CCNC: 21 U/L (ref 9–39)
BASOPHILS # BLD AUTO: 0.08 X10*3/UL (ref 0–0.1)
BASOPHILS NFR BLD AUTO: 0.8 %
BILIRUB SERPL-MCNC: 0.4 MG/DL (ref 0–1.2)
BUN SERPL-MCNC: 13 MG/DL (ref 6–23)
CALCIUM SERPL-MCNC: 9.7 MG/DL (ref 8.6–10.6)
CHLORIDE SERPL-SCNC: 103 MMOL/L (ref 98–107)
CO2 SERPL-SCNC: 30 MMOL/L (ref 21–32)
CREAT SERPL-MCNC: 1.12 MG/DL (ref 0.5–1.05)
EGFRCR SERPLBLD CKD-EPI 2021: 62 ML/MIN/1.73M*2
EOSINOPHIL # BLD AUTO: 0.37 X10*3/UL (ref 0–0.7)
EOSINOPHIL NFR BLD AUTO: 3.8 %
ERYTHROCYTE [DISTWIDTH] IN BLOOD BY AUTOMATED COUNT: 13.2 % (ref 11.5–14.5)
GLUCOSE SERPL-MCNC: 118 MG/DL (ref 74–99)
HCT VFR BLD AUTO: 41.6 % (ref 36–46)
HGB BLD-MCNC: 13.8 G/DL (ref 12–16)
IMM GRANULOCYTES # BLD AUTO: 0.04 X10*3/UL (ref 0–0.7)
IMM GRANULOCYTES NFR BLD AUTO: 0.4 % (ref 0–0.9)
LDH SERPL L TO P-CCNC: 128 U/L (ref 84–246)
LYMPHOCYTES # BLD AUTO: 3.06 X10*3/UL (ref 1.2–4.8)
LYMPHOCYTES NFR BLD AUTO: 31.2 %
MCH RBC QN AUTO: 32.5 PG (ref 26–34)
MCHC RBC AUTO-ENTMCNC: 33.2 G/DL (ref 32–36)
MCV RBC AUTO: 98 FL (ref 80–100)
MONOCYTES # BLD AUTO: 0.61 X10*3/UL (ref 0.1–1)
MONOCYTES NFR BLD AUTO: 6.2 %
NEUTROPHILS # BLD AUTO: 5.66 X10*3/UL (ref 1.2–7.7)
NEUTROPHILS NFR BLD AUTO: 57.6 %
NRBC BLD-RTO: 0 /100 WBCS (ref 0–0)
PLATELET # BLD AUTO: 387 X10*3/UL (ref 150–450)
POTASSIUM SERPL-SCNC: 4.5 MMOL/L (ref 3.5–5.3)
PROT SERPL-MCNC: 7.2 G/DL (ref 6.4–8.2)
RBC # BLD AUTO: 4.25 X10*6/UL (ref 4–5.2)
SODIUM SERPL-SCNC: 142 MMOL/L (ref 136–145)
WBC # BLD AUTO: 9.8 X10*3/UL (ref 4.4–11.3)

## 2024-04-29 ENCOUNTER — HOSPITAL ENCOUNTER (OUTPATIENT)
Dept: RADIOLOGY | Facility: CLINIC | Age: 44
Discharge: HOME | End: 2024-04-29
Payer: COMMERCIAL

## 2024-04-29 DIAGNOSIS — C43.59 MELANOMA OF BACK (MULTI): ICD-10-CM

## 2024-04-29 PROCEDURE — 2550000001 HC RX 255 CONTRASTS: Performed by: NURSE PRACTITIONER

## 2024-04-29 PROCEDURE — 71260 CT THORAX DX C+: CPT | Performed by: RADIOLOGY

## 2024-04-29 PROCEDURE — 74177 CT ABD & PELVIS W/CONTRAST: CPT

## 2024-04-29 PROCEDURE — 74177 CT ABD & PELVIS W/CONTRAST: CPT | Performed by: RADIOLOGY

## 2024-04-29 RX ADMIN — IOHEXOL 75 ML: 350 INJECTION, SOLUTION INTRAVENOUS at 14:03

## 2024-05-03 ENCOUNTER — HOSPITAL ENCOUNTER (EMERGENCY)
Facility: HOSPITAL | Age: 44
Discharge: HOME | End: 2024-05-03
Attending: STUDENT IN AN ORGANIZED HEALTH CARE EDUCATION/TRAINING PROGRAM
Payer: COMMERCIAL

## 2024-05-03 ENCOUNTER — APPOINTMENT (OUTPATIENT)
Dept: RADIOLOGY | Facility: HOSPITAL | Age: 44
End: 2024-05-03
Payer: COMMERCIAL

## 2024-05-03 ENCOUNTER — OFFICE VISIT (OUTPATIENT)
Dept: HEMATOLOGY/ONCOLOGY | Facility: CLINIC | Age: 44
End: 2024-05-03
Payer: COMMERCIAL

## 2024-05-03 VITALS
OXYGEN SATURATION: 98 % | DIASTOLIC BLOOD PRESSURE: 98 MMHG | HEIGHT: 61 IN | SYSTOLIC BLOOD PRESSURE: 165 MMHG | WEIGHT: 180.34 LBS | BODY MASS INDEX: 34.05 KG/M2 | TEMPERATURE: 99 F | HEART RATE: 87 BPM | RESPIRATION RATE: 17 BRPM

## 2024-05-03 VITALS
TEMPERATURE: 97.9 F | SYSTOLIC BLOOD PRESSURE: 157 MMHG | HEART RATE: 94 BPM | DIASTOLIC BLOOD PRESSURE: 97 MMHG | BODY MASS INDEX: 34.16 KG/M2 | OXYGEN SATURATION: 96 % | WEIGHT: 180.78 LBS | RESPIRATION RATE: 18 BRPM

## 2024-05-03 DIAGNOSIS — C43.59 MELANOMA OF BACK (MULTI): ICD-10-CM

## 2024-05-03 DIAGNOSIS — C43.9 METASTATIC MELANOMA (MULTI): Primary | ICD-10-CM

## 2024-05-03 DIAGNOSIS — R10.9 FLANK PAIN: Primary | ICD-10-CM

## 2024-05-03 LAB
ALBUMIN SERPL-MCNC: 4.3 G/DL (ref 3.5–5)
ALP BLD-CCNC: 68 U/L (ref 35–125)
ALT SERPL-CCNC: 40 U/L (ref 5–40)
ANION GAP SERPL CALC-SCNC: 11 MMOL/L
APPEARANCE UR: CLEAR
AST SERPL-CCNC: 34 U/L (ref 5–40)
BASOPHILS # BLD AUTO: 0.1 X10*3/UL (ref 0–0.1)
BASOPHILS NFR BLD AUTO: 0.8 %
BILIRUB SERPL-MCNC: 0.2 MG/DL (ref 0.1–1.2)
BILIRUB UR STRIP.AUTO-MCNC: NEGATIVE MG/DL
BUN SERPL-MCNC: 17 MG/DL (ref 8–25)
CALCIUM SERPL-MCNC: 9.1 MG/DL (ref 8.5–10.4)
CHLORIDE SERPL-SCNC: 104 MMOL/L (ref 97–107)
CO2 SERPL-SCNC: 26 MMOL/L (ref 24–31)
COLOR UR: COLORLESS
CREAT SERPL-MCNC: 1.3 MG/DL (ref 0.4–1.6)
EGFRCR SERPLBLD CKD-EPI 2021: 51 ML/MIN/1.73M*2
EOSINOPHIL # BLD AUTO: 0.44 X10*3/UL (ref 0–0.7)
EOSINOPHIL NFR BLD AUTO: 3.4 %
ERYTHROCYTE [DISTWIDTH] IN BLOOD BY AUTOMATED COUNT: 13.6 % (ref 11.5–14.5)
GLUCOSE SERPL-MCNC: 100 MG/DL (ref 65–99)
GLUCOSE UR STRIP.AUTO-MCNC: NORMAL MG/DL
HCT VFR BLD AUTO: 40.1 % (ref 36–46)
HGB BLD-MCNC: 13.3 G/DL (ref 12–16)
IMM GRANULOCYTES # BLD AUTO: 0.07 X10*3/UL (ref 0–0.7)
IMM GRANULOCYTES NFR BLD AUTO: 0.5 % (ref 0–0.9)
KETONES UR STRIP.AUTO-MCNC: NEGATIVE MG/DL
LEUKOCYTE ESTERASE UR QL STRIP.AUTO: NEGATIVE
LYMPHOCYTES # BLD AUTO: 3.31 X10*3/UL (ref 1.2–4.8)
LYMPHOCYTES NFR BLD AUTO: 25.9 %
MCH RBC QN AUTO: 32.6 PG (ref 26–34)
MCHC RBC AUTO-ENTMCNC: 33.2 G/DL (ref 32–36)
MCV RBC AUTO: 98 FL (ref 80–100)
MONOCYTES # BLD AUTO: 0.81 X10*3/UL (ref 0.1–1)
MONOCYTES NFR BLD AUTO: 6.3 %
NEUTROPHILS # BLD AUTO: 8.04 X10*3/UL (ref 1.2–7.7)
NEUTROPHILS NFR BLD AUTO: 63.1 %
NITRITE UR QL STRIP.AUTO: NEGATIVE
NRBC BLD-RTO: 0 /100 WBCS (ref 0–0)
PH UR STRIP.AUTO: 6.5 [PH]
PLATELET # BLD AUTO: 335 X10*3/UL (ref 150–450)
POTASSIUM SERPL-SCNC: 4.4 MMOL/L (ref 3.4–5.1)
PROT SERPL-MCNC: 7 G/DL (ref 5.9–7.9)
PROT UR STRIP.AUTO-MCNC: NEGATIVE MG/DL
RBC # BLD AUTO: 4.08 X10*6/UL (ref 4–5.2)
RBC # UR STRIP.AUTO: NEGATIVE /UL
SODIUM SERPL-SCNC: 141 MMOL/L (ref 133–145)
SP GR UR STRIP.AUTO: 1
UROBILINOGEN UR STRIP.AUTO-MCNC: NORMAL MG/DL
WBC # BLD AUTO: 12.8 X10*3/UL (ref 4.4–11.3)

## 2024-05-03 PROCEDURE — 81003 URINALYSIS AUTO W/O SCOPE: CPT

## 2024-05-03 PROCEDURE — 74174 CTA ABD&PLVS W/CONTRAST: CPT

## 2024-05-03 PROCEDURE — 80053 COMPREHEN METABOLIC PANEL: CPT

## 2024-05-03 PROCEDURE — 99215 OFFICE O/P EST HI 40 MIN: CPT | Performed by: NURSE PRACTITIONER

## 2024-05-03 PROCEDURE — 74174 CTA ABD&PLVS W/CONTRAST: CPT | Performed by: RADIOLOGY

## 2024-05-03 PROCEDURE — 99284 EMERGENCY DEPT VISIT MOD MDM: CPT | Mod: 25

## 2024-05-03 PROCEDURE — 36415 COLL VENOUS BLD VENIPUNCTURE: CPT

## 2024-05-03 PROCEDURE — 3077F SYST BP >= 140 MM HG: CPT | Performed by: NURSE PRACTITIONER

## 2024-05-03 PROCEDURE — 2550000001 HC RX 255 CONTRASTS: Performed by: STUDENT IN AN ORGANIZED HEALTH CARE EDUCATION/TRAINING PROGRAM

## 2024-05-03 PROCEDURE — 3080F DIAST BP >= 90 MM HG: CPT | Performed by: NURSE PRACTITIONER

## 2024-05-03 PROCEDURE — 85025 COMPLETE CBC W/AUTO DIFF WBC: CPT

## 2024-05-03 RX ORDER — KETOROLAC TROMETHAMINE 30 MG/ML
30 INJECTION, SOLUTION INTRAMUSCULAR; INTRAVENOUS ONCE
Status: DISCONTINUED | OUTPATIENT
Start: 2024-05-03 | End: 2024-05-03

## 2024-05-03 RX ORDER — ACETAMINOPHEN 325 MG/1
975 TABLET ORAL ONCE
Status: COMPLETED | OUTPATIENT
Start: 2024-05-03 | End: 2024-05-03

## 2024-05-03 RX ADMIN — IOHEXOL 75 ML: 350 INJECTION, SOLUTION INTRAVENOUS at 17:06

## 2024-05-03 RX ADMIN — ACETAMINOPHEN 975 MG: 325 TABLET ORAL at 16:42

## 2024-05-03 ASSESSMENT — ENCOUNTER SYMPTOMS
NEUROLOGICAL NEGATIVE: 1
GASTROINTESTINAL NEGATIVE: 1
CONSTITUTIONAL NEGATIVE: 1
FLANK PAIN: 1
CARDIOVASCULAR NEGATIVE: 1
HEMATOLOGIC/LYMPHATIC NEGATIVE: 1
PSYCHIATRIC NEGATIVE: 1
EYES NEGATIVE: 1
RESPIRATORY NEGATIVE: 1
ENDOCRINE NEGATIVE: 1

## 2024-05-03 ASSESSMENT — COLUMBIA-SUICIDE SEVERITY RATING SCALE - C-SSRS
2. HAVE YOU ACTUALLY HAD ANY THOUGHTS OF KILLING YOURSELF?: NO
6. HAVE YOU EVER DONE ANYTHING, STARTED TO DO ANYTHING, OR PREPARED TO DO ANYTHING TO END YOUR LIFE?: NO
1. IN THE PAST MONTH, HAVE YOU WISHED YOU WERE DEAD OR WISHED YOU COULD GO TO SLEEP AND NOT WAKE UP?: NO

## 2024-05-03 ASSESSMENT — PAIN SCALES - GENERAL
PAINLEVEL_OUTOF10: 6
PAINLEVEL: 0-NO PAIN
PAINLEVEL_OUTOF10: 3

## 2024-05-03 ASSESSMENT — PAIN - FUNCTIONAL ASSESSMENT
PAIN_FUNCTIONAL_ASSESSMENT: 0-10
PAIN_FUNCTIONAL_ASSESSMENT: 0-10

## 2024-05-03 NOTE — PROGRESS NOTES
".Patient ID: Melina Rivera is a 44 y.o. female.  Referring Physician: Jenae Rhodes, APRN-CNP  [address] [place] [address]  Primary Care Provider: Ana Herrera MD     Oncology follow up     HPI  Referring Surgeon: Dr. Augie Harris  Dermatologist: Dr. Yessica Salgado  Date of first meeting with our team: 01/02/2022     Date of First meeting with surgical team: 01/03/2022  Melanoma type: Cutaneous Melanoma  Location of primary site: Right lower back melanoma  Date of WLE and SLNB: 01/07/2022  Final pathology: [place] Depth- 2.5mm; Ulceration status- none ; LVI- none ; Other high risk features- none  [place] lymph node status: 3/3 positive- 0.7mm largest deposit.  Final Stage: [id number]- Stage IIIB     Mutation status: BRAF positive.  MRI brain with and without contrast: 01/24/2022- unremarkable  Full body PET scan: 01/24/22- Unremarkable.      Summary:  Ms. Rivera is diagnosed with stage IIIB melanoma. BRAF positive. We recommend one year of adjuvant treatment with immunotherapy. She is on Nivolumab 480mg IV every 4 weeks. Started treatment on March 31, 2022. Surveillance CT scan on 7/13/22 picked up  a new 9mm adrenal nodule. We decided to rescan in 3 months to see the evolution of this nodule. CT scan on 10/7/2022 showed that the adrenal nodule is stable. She completed one year of treatment on 03/01/2023, and is currently on surveillance.      CT scan on 01/03/2023 showing stable changes.   CT scan on 04/03/2023 showed stable changes.  CT scan 11/01/2023 showing stable changes and AALIYAH.   CT scan 04/29/2024 showing AALIYAH. Hypo enhancement of the right kidney noted. Creatinine is rising and she is having right flank pain. Patient sent to ED for evaluation.      Treatment History:    Systemic Treatment  1. Nivolumab 480mg (C1- 03/31/22 to 03/01/23)    Subjective   Please refer to \"Notes/Cancer History\" above for complete History of present illness.     Today,    - Presents to clinic alone.  - " Right flank pain that started in January, but has gotten worse the last month. Pain takes her breath away at times. She had a hysterectomy 12/21/2023 for AUB.   - Continues to follow with dermatology, surgical oncology and her PCP.     Review of Systems:   Review of Systems   Constitutional: Negative.    HENT:  Negative.     Eyes: Negative.    Respiratory: Negative.     Cardiovascular: Negative.    Gastrointestinal: Negative.    Endocrine: Negative.    Musculoskeletal:  Positive for flank pain.        Right flank pain   Skin: Negative.    Neurological: Negative.    Hematological: Negative.    Psychiatric/Behavioral: Negative.          Follows with Dr. Trinh. Doing well with Mirtazapine          MEDICAL HISTORY  Melanoma, anxiety, MDD, migraines     FAMILY HISTORY  Family History   Problem Relation Name Age of Onset    Other (gist of colon) Mother      Other (cholangiocarcinoma) Father      Other (papillary carcinoma) Brother      Other (pten gene mutation) Brother      Other (b cell lymphona) Mother's Sister      Throat cancer Mother's Brother      Colon cancer Maternal Grandfather Don     Bilateral breast cancer Paternal Grandmother      Other (myeloid leukemia) Paternal Cousin         TOBACCO HISTORY  Tobacco Use: High Risk (4/9/2024)    Patient History     Smoking Tobacco Use: Every Day     Smokeless Tobacco Use: Never     Passive Exposure: Not on file       SOCIAL HISTORY  Social Connections: Not on file   , lives with her . No children. Works full time     Outpatient Medication Profile:  Current Outpatient Medications on File Prior to Visit   Medication Sig Dispense Refill    LORazepam (Ativan) 0.5 mg tablet Take 1 tablet (0.5 mg) by mouth 2 times a day as needed for anxiety. 60 tablet 2    losartan (Cozaar) 100 mg tablet Take 1 tablet (100 mg) by mouth once daily. 90 tablet 4    mirtazapine (Remeron) 45 mg tablet Take 1 tablet (45 mg) by mouth once daily at bedtime. 30 tablet 2    multivitamin  tablet Take 1 tablet by mouth once daily.      QUEtiapine (SEROquel) 50 mg tablet Take 1 tablet (50 mg) by mouth once daily at bedtime. 30 tablet 2     No current facility-administered medications on file prior to visit.         Performance Status:  Asymptomatic     Vitals and Measurements:   Vitals:    05/03/24 1301 05/03/24 1306   BP: (!) 174/113 (!) 157/97   Pulse: 94    Resp: 18 18   Temp: 36.6 °C (97.9 °F)    SpO2: 96%    Weight: 82 kg (180 lb 12.4 oz)    PainSc: 0-No pain      Physical Exam:   Physical Exam  Constitutional:       Appearance: Normal appearance.   HENT:      Head: Normocephalic and atraumatic.      Nose: Nose normal.      Mouth/Throat:      Mouth: Mucous membranes are moist.      Pharynx: Oropharynx is clear.   Eyes:      Conjunctiva/sclera: Conjunctivae normal.   Cardiovascular:      Rate and Rhythm: Normal rate and regular rhythm.      Pulses: Normal pulses.      Heart sounds: Normal heart sounds.   Pulmonary:      Effort: Pulmonary effort is normal.      Breath sounds: Normal breath sounds.   Abdominal:      General: Bowel sounds are normal.      Palpations: Abdomen is soft.   Musculoskeletal:         General: Normal range of motion.      Cervical back: Neck supple.   Skin:     General: Skin is warm and dry.      Comments: Well healed incision on back without nodules    Neurological:      General: No focal deficit present.      Mental Status: She is alert and oriented to person, place, and time.   Psychiatric:         Mood and Affect: Mood normal.         Behavior: Behavior normal.      Lab Results:  I have reviewed these laboratory results:     Lab Results   Component Value Date    WBC 9.8 04/26/2024    HGB 13.8 04/26/2024    HCT 41.6 04/26/2024    MCV 98 04/26/2024     04/26/2024         Chemistry    Lab Results   Component Value Date/Time     04/26/2024 1634    K 4.5 04/26/2024 1634     04/26/2024 1634    CO2 30 04/26/2024 1634    BUN 13 04/26/2024 1634    CREATININE  1.12 (H) 04/26/2024 1634    Lab Results   Component Value Date/Time    CALCIUM 9.7 04/26/2024 1634    ALKPHOS 61 04/26/2024 1634    AST 21 04/26/2024 1634    ALT 34 04/26/2024 1634    BILITOT 0.4 04/26/2024 1634            Lab Results   Component Value Date    TSH 1.79 04/03/2023        Radiology Result:  I have reviewed the latest Imaging in PACS and the findings are noted in this note. I discussed the results of the latest imaging with the patient. All previous imaging were reviewed at the time it was completed. Full records are available in the EMR for review as well.     CT CHEST ABDOMEN PELVIS W IV CONTRAST; 4/29/2024 2:09 pm     IMPRESSION:  CHEST:  1.  No evidence of metastatic disease in the chest.      ABDOMEN-PELVIS:  1.  No evidence of metastatic disease in the abdomen and pelvis.  2. Delayed enhancement and hypoenhancement of the upper and midpole  of the right kidney, new from prior, concerning for sequela of  ischemic or inflammatory changes. Consider dedicated CTA of the renal  arteries for further assessment, as the current single-phase  contrast-enhanced exam is not optimized for evaluation of the renal  vessels.      Pathology Results:  I have reviewed the full pathology report recorded in the EMR. The pertinent portions indicating diagnosis are listed here in the note. for details please refer to the full report recorded in the EMR.    Dermatopathology [Jan 14 2022 2:08PM] ([id number])    Specimens: NODE, RIGHT AXILLARY SENTINEL LYMPH NODE # 1, COUNT = 229 /NODE,  RIGHT AXILLARY SENTINEL LYMPH NODE #2, COUNT =265 /NODE, LEFT INGUINAL SENTINEL LYMPH NODE #1, COUNT = 712 /SKIN, LEFT UPPER BACK SKIN LESION /SKIN, RIGHT LOWER BACK MELANOMA /SKIN, RIGHT BACK INFERIOR STANDING CONE STITCH BRITT, APEX /RIGHT BACK SUPERIOR  STANDING CONE STITCH MARKS, APEX /Received in formalin is a tan-yellow irregularly shaped piece of skin     Name GRETTA MOSQUERA     Accession #:    Pathologist: SHERLEY ROBERT  MD MARCO A   Date of Procedure: 1/7/2022   Date Received:  1/10/2022   Date Reported 1/14/2022   Submitting Physician: DEE DEE SAWANT MD   Location: [facility] Other External #     FINAL DIAGNOSIS   A. NODE, RIGHT AXILLARY SENTINEL LYMPH NODE # 1, COUNT = 229, EXCISION:   MICROMETASTATIC  MALIGNANT MELANOMA IN 1/1 LYMPH NODE, SEE NOTE.     Note: Microscopic examination reveals a lymph node. In the subcapsular and parenchymal space there are collections of atypical epithelioid melanocytes that stain with antibodies against Melan-A,  SOX-10, and HMB45. The largest deposit is 0.6 mm in greatest diameter. No extracapsular extension is seen.     B. NODE, RIGHT AXILLARY SENTINEL LYMPH NODE #2, COUNT =265, EXCISION:   METASTATIC MALIGNANT MELANOMA IN 1/1 LYMPH NODE AND TATTOO,SEE  NOTE.     Note: Microscopic examination reveals subcapsular deposits of atypical epithelioid cells that stain with antibodies against Melan-A, SOX-10 and HMB45. All control slides stain appropriately. No extracapsular extension is seen.      C. NODE, LEFT INGUINAL SENTINEL LYMPH NODE #1, COUNT = 712, EXCISION:   METASTATIC MALIGNANT MELANOMA IN 1/1 LYMPH NODE AND TATTOO, SEE NOTE.     Note: Microscopic examination reveals a lymph node with black tattoo pigment. In the subcapsular  and parenchymal space there are collections of atypical epithelioid melanocytes that stain with antibodies against Melan-A, SOX-10, and HMB45. All control slides stain appropriately. This deposit is 0.7 mm in greatest diameter and no extracapsular extension  is seen.     D. SKIN, LEFT UPPER BACK SKIN LESION, EXCISION:   CHANGES CONSISTENT WITH PREVIOUS PROCEDURE, INKED MARGINS FREE IN PLANES OF   SECTIONS EXAMINED AND TATTOO WITHOUT RESIDUAL ATYPICAL MELANOCYTIC NEOPLASM   SEEN.      E. SKIN, RIGHT LOWER BACK MELANOMA, EXCISION:   CHANGES CONSISTENT WITH PREVIOUS PROCEDURE, INKED MARGINS FREE IN PLANES OF   SECTIONS EXAMINED WITHOUT RESIDUAL ATYPICAL MELANOCYTIC NEOPLASM  SEEN.     F. SKIN, RIGHT BACK INFERIOR STANDING  CONE STITCH BRITT, APEX, EXCISION:   UNREMARKABLE SKIN.     G. RIGHT BACK SUPERIOR STANDING CONE STITCH BRITT, APEX, EXCISION:   UNREMARKABLE SKIN.     Electronically Signed Out By SHERLEY STRICKLAND MD/SALINA   By the signature on  this report, the individual or group listed as making the   Final Interpretation/Diagnosis certifies that they have reviewed this case.     Addendum/Procedures:   Special Oncology Report Date Ordered: 2/2/2022 Status: Signed Out   Date  Complete: 2/2/2022   Date Reported: 2/2/2022     Addendum Diagnosis   TEST: Solid Focus Tumor DNA Panel   SPECIMEN: FFPE, Skin, Back, Right, Biopsy, [id number]   DISEASE DIAGNOSIS: Melanoma   Estimated Tumor Content: 30%    COLLECTION DATE: 12/29/2021   RECEIVED DATE: 01/28/2022   REPORT DATE: 02/02/2022     MICROSATELLITE STATUS: Microsatellite Stable (KATHERIN)   DISEASE ASSOCIATED GENOMIC FINDINGS:   BRAF p.V600E ([id number]: [id number])      Assessment and Plan:   Assessment/Plan   Mrs. Melina Rivera is a 44 y.o. female with a diagnosis of stage IIIB melanoma of the back. WLE/SLNB 01/07/2022. Completed one year of adjuvant Nivolumab 03/01/2023. Currently on surveillance. Please see the evolution of the case listed above in the cancer history..    CT scan 04/29/2024 showing AALIYAH. Hypo enhancement of the right kidney noted. Creatinine is rising and elevated at 1.12 on labs. At our visit today, she told me she has been having right flank pain since January, but worse the last month. At times, it takes her breath away. I am concerned about a possible infarction and sent her to the ED for further evaluation. I scheduled a follow up visit on Tuesday, 05/07/2024 with me.      The surveillance plan for stage IIIB melanoma involves  1. Clinical exam and history every 6 months for the first two years and then every 12 months to complete 5 years.   2. CT scan every 6 months for first two years and then every 12 months to  complete 5 years.   3. Dermatology follow up for skin check.   4. Follow up with Surgical Oncology for Ultrasound of the lymph node basin. I sent her to schedule this today.       Patient can always call me earlier if he notices any changes in his status, or any physician involved in the care feels that the patient needs to be seen by our team..     # Elevated BP   - Checks her BP at home. Running 130/50's.   - Following with her PCP.       # Anxiety  - Following with Dr. Trinh.      # Health maintenance  - Follows with PCP for wellness visits and age appropriate cancer screenings.      DISCLAIMER:   In preparing for this visit and writing this note, I reviewed all the previous electronic medical records (labs, imaging and medical charts) of the patient available in the physician portal. Significant findings which helped in decision making are recorded  in this chart.     The plan was discussed with the patient. We gave her ample opportunities to ask questions. All questions were answered to her satisfaction and she verbalized understanding.

## 2024-05-03 NOTE — PATIENT INSTRUCTIONS
Mrs. Melina Rivera ,  It was a pleasure talking to you today.    As discussed, I have ordered an ultrasound for you. I will call you with results. Please keep your appointments with dermatology and surgical oncology.     Our offices will be reaching out to you to make all the appointments. I am always available at the phone numbers listed below for an earlier appointment should you wish one.    In case of an emergency please dial 911 or report to your nearest Emergency Room.  For all other questions, please do not hesitate to reach out to us at the number listed below.    Thank you for choosing [facility] at [facility].   We appreciate your visit.     MD Jenae Narvaez, ELOY Gregory, RN    Sarcoma and Cutaneous Oncology team    Phone: [phone]  Fax: [phone].

## 2024-05-03 NOTE — ED PROVIDER NOTES
HPI   Chief Complaint   Patient presents with    Flank Pain     Pt complains of right flank pain for multiple months.  States there is a mass on her right kidney that her oncologist is concerned about and wants her checked at the ED.       HPI  Patient is a 44-year-old female with history of metastatic melanoma with mass on right kidney who presents to ED for right-sided flank pain.  Denies any history of kidney stones, denies hematuria.  Patient was sent from oncology who was concerned about mass on right kidney.  There was concern for renal infarct, it was requested that we obtain CTA of abdomen and pelvis for evaluation of renal artery stenosis causing renal infarct.  Patient is uncomfortable but not in acute distress.  She denies any fever or chills, headache or dizziness, chest pain or shortness of breath, abdominal pain or NVD, urinary symptoms.                  Regine Coma Scale Score: 15                     Patient History   Past Medical History:   Diagnosis Date    Anxiety     Depression     Fibroid     Hypertension     Melanoma of back (Multi) 12/29/2021    SCC (squamous cell carcinoma) 08/25/2023    SCC     Past Surgical History:   Procedure Laterality Date    ANTERIOR CRUCIATE LIGAMENT REPAIR      HYSTERECTOMY  12/21/2023    OTHER SURGICAL HISTORY  11/29/2022    Knee surgery    OTHER SURGICAL HISTORY  11/29/2022    Excision melanoma     Family History   Problem Relation Name Age of Onset    Other (gist of colon) Mother      Other (cholangiocarcinoma) Father      Other (papillary carcinoma) Brother      Other (pten gene mutation) Brother      Other (b cell lymphona) Mother's Sister      Throat cancer Mother's Brother      Colon cancer Maternal Grandfather Don     Bilateral breast cancer Paternal Grandmother      Other (myeloid leukemia) Paternal Cousin       Social History     Tobacco Use    Smoking status: Every Day     Current packs/day: 1.00     Average packs/day: 1 pack/day for 24.3 years (24.3 ttl  pk-yrs)     Types: Cigarettes     Start date: 1/1/2000    Smokeless tobacco: Never   Vaping Use    Vaping status: Never Used   Substance Use Topics    Alcohol use: Yes     Alcohol/week: 2.0 standard drinks of alcohol     Types: 2 Standard drinks or equivalent per week    Drug use: Never       Physical Exam   ED Triage Vitals [05/03/24 1543]   Temperature Heart Rate Respirations BP   37.2 °C (99 °F) 94 18 (!) 176/106      Pulse Ox Temp src Heart Rate Source Patient Position   97 % -- -- --      BP Location FiO2 (%)     -- --       Physical Exam  Vitals reviewed.   Constitutional:       Appearance: Normal appearance.   HENT:      Head: Normocephalic and atraumatic.   Eyes:      Extraocular Movements: Extraocular movements intact.   Cardiovascular:      Rate and Rhythm: Normal rate and regular rhythm.   Pulmonary:      Effort: Pulmonary effort is normal.      Breath sounds: Normal breath sounds.   Abdominal:      General: Abdomen is flat.      Palpations: Abdomen is soft.      Tenderness: There is no abdominal tenderness.   Musculoskeletal:         General: Normal range of motion.      Cervical back: Neck supple.   Skin:     General: Skin is warm and dry.   Neurological:      General: No focal deficit present.      Mental Status: She is alert and oriented to person, place, and time.   Psychiatric:         Mood and Affect: Mood normal.         Behavior: Behavior normal.         ED Course & MDM   Diagnoses as of 05/03/24 1905   Flank pain       Medical Decision Making  Parts of this chart have been completed using voice recognition software. Please excuse any errors of transcription.  My thought process and reason for plan has been formulated from the time that I saw the patient until the time of disposition and is not specific to one specific moment during their visit and furthermore my MDM encompasses this entire chart and not only this text box.    HPI:   Detailed above.    Exam:   A medically appropriate exam  performed, outlined above, given the known history and presentation.    History obtained from:   Patient EMR    EKG/Cardiac monitor:       Social Determinants of Health considered during this visit:   Housing, Family or social support    Medications given during visit:  Medications   acetaminophen (Tylenol) tablet 975 mg (975 mg oral Given 5/3/24 1642)   iohexol (OMNIPaque) 350 mg iodine/mL solution 75 mL (75 mL intravenous Given 5/3/24 1706)        Diagnostic/tests:  Labs Reviewed   CBC WITH AUTO DIFFERENTIAL - Abnormal       Result Value    WBC 12.8 (*)     nRBC 0.0      RBC 4.08      Hemoglobin 13.3      Hematocrit 40.1      MCV 98      MCH 32.6      MCHC 33.2      RDW 13.6      Platelets 335      Neutrophils % 63.1      Immature Granulocytes %, Automated 0.5      Lymphocytes % 25.9      Monocytes % 6.3      Eosinophils % 3.4      Basophils % 0.8      Neutrophils Absolute 8.04 (*)     Immature Granulocytes Absolute, Automated 0.07      Lymphocytes Absolute 3.31      Monocytes Absolute 0.81      Eosinophils Absolute 0.44      Basophils Absolute 0.10     COMPREHENSIVE METABOLIC PANEL - Abnormal    Glucose 100 (*)     Sodium 141      Potassium 4.4      Chloride 104      Bicarbonate 26      Urea Nitrogen 17      Creatinine 1.30      eGFR 51 (*)     Calcium 9.1      Albumin 4.3      Alkaline Phosphatase 68      Total Protein 7.0      AST 34      Bilirubin, Total 0.2      ALT 40      Anion Gap 11     URINALYSIS WITH REFLEX CULTURE AND MICROSCOPIC - Abnormal    Color, Urine Colorless (*)     Appearance, Urine Clear      Specific Gravity, Urine 1.005      pH, Urine 6.5      Protein, Urine NEGATIVE      Glucose, Urine Normal      Blood, Urine NEGATIVE      Ketones, Urine NEGATIVE      Bilirubin, Urine NEGATIVE      Urobilinogen, Urine Normal      Nitrite, Urine NEGATIVE      Leukocyte Esterase, Urine NEGATIVE     URINALYSIS WITH REFLEX CULTURE AND MICROSCOPIC    Narrative:     The following orders were created for panel  "order Urinalysis with Reflex Culture and Microscopic.  Procedure                               Abnormality         Status                     ---------                               -----------         ------                     Urinalysis with Reflex C...[[id number]]  Abnormal            Final result               Extra Urine Gray Tube[[id number]]                                                         Please view results for these tests on the individual orders.   EXTRA URINE GRAY TUBE      CT angio abdomen pelvis w and or wo IV IV contrast   Final Result   1. 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 There is delayed but   continuous cortical enhancement without focal defects to suggest   acute infarct. The underlying etiology is not certain, although there   is thickening of the right diaphragmatic danielle and \"renal vascular   compression by the diaphragmatic danielle\" should be considered. The   right inferior pole artery is patent and there is no atrophy of the   inferior pole. Vascular surgery consult could be considered,   particularly in the setting uncontrolled hypertension.   2. 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 This   could also represent the right diaphragmatic danielle. The celiac artery   and superior mesenteric artery distributions are well collateralized   by a very dilated intra-abdominal collaterals, including the marginal   artery of [place].   3. Linear ground-glass opacity at the right lung base. This   appearance is most typical of atelectasis, however, the finding is   persistent on all 3 phases of the study and was not seen on any   comparisons. Clinical correlation for possible atypical pneumonia is "   suggested.        Signed by: Gregorio Palomino 5/3/2024 6:43 PM   Dictation workstation:   [id number]           Differential Diagnosis:   As I have deemed necessary from their history, physical, and studies, I have considered the following diagnoses:     NEPHROLITHIASIS  PYELONEPHRITIS  AORTIC ANEURYSM  SMALL BOWEL OBSTRUCTION  DIVERTICULITIS  APPENDICITIS  BILIARY COLIC  CHOLECYSTITIS  RENAL CELL CARCINOMA    Consultations:      Procedures:      Critical Care:      Referrals:      Discharge Prescriptions:      MDM Summary:  CT imaging shows chronic changes but no acute process.  See report from radiologist for more detail.  Patient is hemodynamically stable and vital signs are within normal limits.  She is tolerating her pain well.  She is a mild leukocytosis of 12.8, no anemia.  CMP shows no electrolyte abnormality or acute kidney injury.  Urinalysis shows no evidence of urinary tract infection.  Patient is safe for discharge.  She has follow-up with oncologist next week.    We have discussed the diagnosis and risks, and we agree with discharging home to follow-up with appropriate physician as directed. We also discussed returning to the Emergency Department immediately if new or worsening symptoms occur. We have discussed the symptoms which are most concerning that necessitate immediate return. Pt symptoms have been well controlled here and the patient is safe for discharge with appropriate outpatient follow up. The patient has verbalized understanding to return to ER without delay for new or worsening pains or for any other symptoms or concerns. I utilized the discharge clinical management tool provided [employer] to help estimate risk of negative outcome for this patient.      Disposition:  The patient was discharged    Admission/observation considered: No  Reason for deciding against admission/observation: Did not meet inpatient criteria      Procedure  Procedures     Kelby Baker,  VISH  05/03/24 1914

## 2024-05-06 ENCOUNTER — OFFICE VISIT (OUTPATIENT)
Dept: PRIMARY CARE | Facility: CLINIC | Age: 44
End: 2024-05-06
Payer: COMMERCIAL

## 2024-05-06 VITALS
SYSTOLIC BLOOD PRESSURE: 130 MMHG | BODY MASS INDEX: 33.82 KG/M2 | OXYGEN SATURATION: 98 % | DIASTOLIC BLOOD PRESSURE: 84 MMHG | TEMPERATURE: 98.2 F | WEIGHT: 179 LBS | HEART RATE: 88 BPM

## 2024-05-06 DIAGNOSIS — J18.9 ATYPICAL PNEUMONIA: Primary | ICD-10-CM

## 2024-05-06 PROCEDURE — 3079F DIAST BP 80-89 MM HG: CPT | Performed by: NURSE PRACTITIONER

## 2024-05-06 PROCEDURE — 99214 OFFICE O/P EST MOD 30 MIN: CPT | Performed by: NURSE PRACTITIONER

## 2024-05-06 PROCEDURE — 3075F SYST BP GE 130 - 139MM HG: CPT | Performed by: NURSE PRACTITIONER

## 2024-05-06 RX ORDER — BENZONATATE 200 MG/1
200 CAPSULE ORAL 3 TIMES DAILY PRN
Qty: 21 CAPSULE | Refills: 0 | Status: SHIPPED | OUTPATIENT
Start: 2024-05-06 | End: 2024-05-13

## 2024-05-06 RX ORDER — CLARITHROMYCIN 500 MG/1
500 TABLET, FILM COATED ORAL 2 TIMES DAILY
Qty: 20 TABLET | Refills: 0 | Status: SHIPPED | OUTPATIENT
Start: 2024-05-06 | End: 2024-05-16

## 2024-05-06 RX ORDER — ALBUTEROL SULFATE 90 UG/1
2 AEROSOL, METERED RESPIRATORY (INHALATION) EVERY 4 HOURS PRN
Qty: 13.4 G | Refills: 0 | Status: SHIPPED | OUTPATIENT
Start: 2024-05-06 | End: 2024-06-05

## 2024-05-06 ASSESSMENT — ENCOUNTER SYMPTOMS
SINUS PAIN: 0
NAUSEA: 0
WHEEZING: 0
HEADACHES: 1
DIZZINESS: 1
DIARRHEA: 0
CHILLS: 1
FEVER: 1
COUGH: 1
CHEST TIGHTNESS: 1
SINUS PRESSURE: 0
SHORTNESS OF BREATH: 1
BACK PAIN: 1
SORE THROAT: 0
VOMITING: 0

## 2024-05-06 ASSESSMENT — PAIN SCALES - GENERAL: PAINLEVEL: 6

## 2024-05-06 NOTE — PROGRESS NOTES
Subjective   Patient ID: Melina Rivera is a 44 y.o. female who presents for Follow-up ([facility] ER 5/3, pneumonia. ).    HPI   Patient is a 44-year-old female who presents for ED follow-up.  She was seen in [facility] ED on 5/3/2024 with right-sided flank pain.  She has history of metastatic melanoma and was sent by oncology to the ED with concerns for mass on right kidney or renal infarct.  Workup revealed mild leukocytosis of 12.8.  CT angio abdomen and pelvis showed linear groundglass opacity at the right lung base could be atelectasis however persistent on all 3 phases of study.  Could be possible atypical pneumonia.  She was discharged home to follow-up with primary care.  No antibiotics given    Since that time, she continues to have right back and flank pain, chest feels heavy, coughing with no sputum, mild sob.   Had fever today 100.9, took tylenol at 11am    +smoker approx 1 pack per day    Will be seeing oncology team tomorrow    Review of Systems   Constitutional:  Positive for chills and fever (fever of 100.9).   HENT:  Negative for congestion, ear pain, sinus pressure, sinus pain and sore throat.    Respiratory:  Positive for cough, chest tightness and shortness of breath. Negative for wheezing.    Cardiovascular:  Positive for chest pain.   Gastrointestinal:  Negative for diarrhea, nausea and vomiting.   Musculoskeletal:  Positive for back pain.   Neurological:  Positive for dizziness and headaches.       Objective   /84   Pulse 88   Temp 36.8 °C (98.2 °F)   Wt 81.2 kg (179 lb)   SpO2 98%   BMI 33.82 kg/m²     Physical Exam  Gen: A/O x3 NAD  Eyes: WNL  ENT: Bilat TM dull. Auditory canals wnl. Bilat nares red and patent.  Oropharynx clear with moist mucous membranes.  No exudate. Uvula midline.  Lungs: Breath sounds diminished right base and coarse bilaterally with moist cough. No wheeze. Speaks complete sentences/Unlabored.  Heart: RRR, no murmur  Neck: supple, no adenopathy  Abd: soft NT/ND,  no CVAT concern for atypical pneumonia  Hospital records reviewed in  Skin: warm/dry, no rash  Neuro: grossly intact      Assessment/Plan   Diagnoses and all orders for this visit:  Atypical pneumonia  -     clarithromycin (Biaxin) 500 mg tablet; Take 1 tablet (500 mg) by mouth 2 times a day for 10 days.  -     albuterol (Ventolin HFA) 90 mcg/actuation inhaler; Inhale 2 puffs every 4 hours if needed for wheezing or shortness of breath (cough, chest tightness).  -     benzonatate (Tessalon) 200 mg capsule; Take 1 capsule (200 mg) by mouth 3 times a day as needed for cough for up to 7 days. Do not crush or chew.  Concern for atypical pneumonia  Will start on antibiotics  Use albuterol and Tessalon Perles as needed  Fluids, rest, humidifier, OTC as discussed  Follow-up in 7 to 10 days for reevaluation, sooner if worse  See oncology team as scheduled  Other orders  -     Follow Up In Primary Care - Established; Future

## 2024-05-07 ENCOUNTER — TELEMEDICINE (OUTPATIENT)
Dept: HEMATOLOGY/ONCOLOGY | Facility: HOSPITAL | Age: 44
End: 2024-05-07
Payer: COMMERCIAL

## 2024-05-07 DIAGNOSIS — C77.9 MELANOMA METASTATIC TO LYMPH NODE (MULTI): Primary | ICD-10-CM

## 2024-05-07 DIAGNOSIS — I10 PRIMARY HYPERTENSION: ICD-10-CM

## 2024-05-07 DIAGNOSIS — I70.1 RENAL ARTERY STENOSIS (CMS-HCC): ICD-10-CM

## 2024-05-07 PROCEDURE — 99443 PR PHYS/QHP TELEPHONE EVALUATION 21-30 MIN: CPT | Performed by: NURSE PRACTITIONER

## 2024-05-07 ASSESSMENT — ENCOUNTER SYMPTOMS
EYES NEGATIVE: 1
NEUROLOGICAL NEGATIVE: 1
CARDIOVASCULAR NEGATIVE: 1
RESPIRATORY NEGATIVE: 1
CONSTITUTIONAL NEGATIVE: 1
ENDOCRINE NEGATIVE: 1
PSYCHIATRIC NEGATIVE: 1
HEMATOLOGIC/LYMPHATIC NEGATIVE: 1
FLANK PAIN: 1
GASTROINTESTINAL NEGATIVE: 1

## 2024-05-07 NOTE — PROGRESS NOTES
.Patient ID: Melina Rivera is a 44 y.o. female.  Referring Physician: No referring provider defined for this encounter.  Primary Care Provider: Ana Herrera MD     Oncology follow up     HPI  Referring Surgeon: Dr. Augie Harris  Dermatologist: Dr. Yessica Salgado  Date of first meeting with our team: 01/02/2022     Date of First meeting with surgical team: 01/03/2022  Melanoma type: Cutaneous Melanoma  Location of primary site: Right lower back melanoma  Date of WLE and SLNB: 01/07/2022  Final pathology: [place] Depth- 2.5mm; Ulceration status- none ; LVI- none ; Other high risk features- none  [place] lymph node status: 3/3 positive- 0.7mm largest deposit.  Final Stage: [id number]- Stage IIIB     Mutation status: BRAF positive.  MRI brain with and without contrast: 01/24/2022- unremarkable  Full body PET scan: 01/24/22- Unremarkable.      Summary:  Ms. Rivera is diagnosed with stage IIIB melanoma. BRAF positive. We recommend one year of adjuvant treatment with immunotherapy. She is on Nivolumab 480mg IV every 4 weeks. Started treatment on March 31, 2022. Surveillance CT scan on 7/13/22 picked up  a new 9mm adrenal nodule. We decided to rescan in 3 months to see the evolution of this nodule. CT scan on 10/7/2022 showed that the adrenal nodule is stable. She completed one year of treatment on 03/01/2023, and is currently on surveillance.      CT scan on 01/03/2023 showing stable changes.   CT scan on 04/03/2023 showed stable changes.  CT scan 11/01/2023 showing stable changes and AALIYAH.   CT scan 04/29/2024 showing AALIYAH. Hypo enhancement of the right kidney noted. Creatinine is rising and she is having right flank pain. Patient sent to ED for evaluation. CT angio performed and no acute issue noted. I made a referral for her to meet with Dr. Reina to discuss renal artery stenosis and HTN.      Treatment History:    Systemic Treatment  1. Nivolumab 480mg (C1- 03/31/22 to 03/01/23)    Subjective   Please refer to  "\"Notes/Cancer History\" above for complete History of present illness.     Today,    - Went to the ED 05/03/2024.  - Met with her PCP and given antibiotics for pneumonia.   - Continues to follow with dermatology, surgical oncology and her PCP.     Review of Systems:   Review of Systems   Constitutional: Negative.    HENT:  Negative.     Eyes: Negative.    Respiratory: Negative.     Cardiovascular: Negative.    Gastrointestinal: Negative.    Endocrine: Negative.    Musculoskeletal:  Positive for flank pain.        Right flank pain   Skin: Negative.    Neurological: Negative.    Hematological: Negative.    Psychiatric/Behavioral: Negative.          Follows with Dr. Trinh. Doing well with Mirtazapine          MEDICAL HISTORY  Melanoma, anxiety, MDD, migraines     FAMILY HISTORY  Family History   Problem Relation Name Age of Onset    Other (gist of colon) Mother      Other (cholangiocarcinoma) Father      Other (papillary carcinoma) Brother      Other (pten gene mutation) Brother      Other (b cell lymphona) Mother's Sister      Throat cancer Mother's Brother      Colon cancer Maternal Grandfather Don     Bilateral breast cancer Paternal Grandmother      Other (myeloid leukemia) Paternal Cousin         TOBACCO HISTORY  Tobacco Use: High Risk (5/6/2024)    Patient History     Smoking Tobacco Use: Every Day     Smokeless Tobacco Use: Never     Passive Exposure: Not on file       SOCIAL HISTORY  Social Connections: Not on file   , lives with her . No children. Works full time     Outpatient Medication Profile:  Current Outpatient Medications on File Prior to Visit   Medication Sig Dispense Refill    albuterol (Ventolin HFA) 90 mcg/actuation inhaler Inhale 2 puffs every 4 hours if needed for wheezing or shortness of breath (cough, chest tightness). 13.4 g 0    benzonatate (Tessalon) 200 mg capsule Take 1 capsule (200 mg) by mouth 3 times a day as needed for cough for up to 7 days. Do not crush or chew. 21 capsule " 0    clarithromycin (Biaxin) 500 mg tablet Take 1 tablet (500 mg) by mouth 2 times a day for 10 days. 20 tablet 0    LORazepam (Ativan) 0.5 mg tablet Take 1 tablet (0.5 mg) by mouth 2 times a day as needed for anxiety. 60 tablet 2    losartan (Cozaar) 100 mg tablet Take 1 tablet (100 mg) by mouth once daily. 90 tablet 4    mirtazapine (Remeron) 45 mg tablet Take 1 tablet (45 mg) by mouth once daily at bedtime. 30 tablet 2    multivitamin tablet Take 1 tablet by mouth once daily.      QUEtiapine (SEROquel) 50 mg tablet Take 1 tablet (50 mg) by mouth once daily at bedtime. 30 tablet 2     No current facility-administered medications on file prior to visit.         Performance Status:  Asymptomatic     Vitals and Measurements:   There were no vitals filed for this visit.  Telephone visit     Physical Exam:   Physical Exam   Telephone visit     Lab Results:  I have reviewed these laboratory results:     Lab Results   Component Value Date    WBC 12.8 (H) 05/03/2024    HGB 13.3 05/03/2024    HCT 40.1 05/03/2024    MCV 98 05/03/2024     05/03/2024         Chemistry    Lab Results   Component Value Date/Time     05/03/2024 1602    K 4.4 05/03/2024 1602     05/03/2024 1602    CO2 26 05/03/2024 1602    BUN 17 05/03/2024 1602    CREATININE 1.30 05/03/2024 1602    Lab Results   Component Value Date/Time    CALCIUM 9.1 05/03/2024 1602    ALKPHOS 68 05/03/2024 1602    AST 34 05/03/2024 1602    ALT 40 05/03/2024 1602    BILITOT 0.2 05/03/2024 1602            Lab Results   Component Value Date    TSH 1.79 04/03/2023        Radiology Result:  I have reviewed the latest Imaging in PACS and the findings are noted in this note. I discussed the results of the latest imaging with the patient. All previous imaging were reviewed at the time it was completed. Full records are available in the EMR for review as well.     CT CHEST ABDOMEN PELVIS W IV CONTRAST; 4/29/2024 2:09 pm     IMPRESSION:  CHEST:  1.  No evidence of  "metastatic disease in the chest.      ABDOMEN-PELVIS:  1.  No evidence of metastatic disease in the abdomen and pelvis.  2. Delayed enhancement and hypoenhancement of the upper and midpole  of the right kidney, new from prior, concerning for sequela of  ischemic or inflammatory changes. Consider dedicated CTA of the renal  arteries for further assessment, as the current single-phase  contrast-enhanced exam is not optimized for evaluation of the renal  vessels.   =================================================  CT ANGIO ABDOMEN PELVIS W AND/OR WO IV IV CONTRAST; 5/3/2024 5:23 pm     IMPRESSION:  1. Chronic high-grade stenosis of the superior pole right renal  artery ostium associated with mild delayed enhancement and atrophy of  the superior pole of the right kidney. There is delayed but  continuous cortical enhancement without focal defects to suggest  acute infarct. The underlying etiology is not certain, although there  is thickening of the right diaphragmatic danielle and \"renal vascular  compression by the diaphragmatic danielle\" should be considered. The  right inferior pole artery is patent and there is no atrophy of the  inferior pole. Vascular surgery consult could be considered,  particularly in the setting uncontrolled hypertension.  2. 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 This  could also represent the right diaphragmatic danielle. The celiac artery  and superior mesenteric artery distributions are well collateralized  by a very dilated intra-abdominal collaterals, including the marginal  artery of [place].  3. Linear ground-glass opacity at the right lung base. This  appearance is most typical of atelectasis, however, the finding is  persistent on all 3 phases of the study and was not seen on any  comparisons. Clinical correlation for possible atypical " pneumonia is  Suggested      Pathology Results:  I have reviewed the full pathology report recorded in the EMR. The pertinent portions indicating diagnosis are listed here in the note. for details please refer to the full report recorded in the EMR.    Dermatopathology [Jan 14 2022 2:08PM] ([id number])    Specimens: NODE, RIGHT AXILLARY SENTINEL LYMPH NODE # 1, COUNT = 229 /NODE,  RIGHT AXILLARY SENTINEL LYMPH NODE #2, COUNT =265 /NODE, LEFT INGUINAL SENTINEL LYMPH NODE #1, COUNT = 712 /SKIN, LEFT UPPER BACK SKIN LESION /SKIN, RIGHT LOWER BACK MELANOMA /SKIN, RIGHT BACK INFERIOR STANDING CONE STITCH BRITT, APEX /RIGHT BACK SUPERIOR  STANDING CONE STITCH MARKS, APEX /Received in formalin is a tan-yellow irregularly shaped piece of skin     Name GRETTA MOSQUERATeddy     Accession #:    Pathologist: SHERLEY STRICKLAND MD   Date of Procedure: 1/7/2022   Date Received:  1/10/2022   Date Reported 1/14/2022   Submitting Physician: DEE DEE SAWANT MD   Location: [facility] Other External #     FINAL DIAGNOSIS   A. NODE, RIGHT AXILLARY SENTINEL LYMPH NODE # 1, COUNT = 229, EXCISION:   MICROMETASTATIC  MALIGNANT MELANOMA IN 1/1 LYMPH NODE, SEE NOTE.     Note: Microscopic examination reveals a lymph node. In the subcapsular and parenchymal space there are collections of atypical epithelioid melanocytes that stain with antibodies against Melan-A,  SOX-10, and HMB45. The largest deposit is 0.6 mm in greatest diameter. No extracapsular extension is seen.     B. NODE, RIGHT AXILLARY SENTINEL LYMPH NODE #2, COUNT =265, EXCISION:   METASTATIC MALIGNANT MELANOMA IN 1/1 LYMPH NODE AND TATTOO,SEE  NOTE.     Note: Microscopic examination reveals subcapsular deposits of atypical epithelioid cells that stain with antibodies against Melan-A, SOX-10 and HMB45. All control slides stain appropriately. No extracapsular extension is seen.      C. NODE, LEFT INGUINAL SENTINEL LYMPH NODE #1, COUNT = 712, EXCISION:   METASTATIC MALIGNANT MELANOMA IN  1/1 LYMPH NODE AND TATTOO, SEE NOTE.     Note: Microscopic examination reveals a lymph node with black tattoo pigment. In the subcapsular  and parenchymal space there are collections of atypical epithelioid melanocytes that stain with antibodies against Melan-A, SOX-10, and HMB45. All control slides stain appropriately. This deposit is 0.7 mm in greatest diameter and no extracapsular extension  is seen.     D. SKIN, LEFT UPPER BACK SKIN LESION, EXCISION:   CHANGES CONSISTENT WITH PREVIOUS PROCEDURE, INKED MARGINS FREE IN PLANES OF   SECTIONS EXAMINED AND TATTOO WITHOUT RESIDUAL ATYPICAL MELANOCYTIC NEOPLASM   SEEN.      E. SKIN, RIGHT LOWER BACK MELANOMA, EXCISION:   CHANGES CONSISTENT WITH PREVIOUS PROCEDURE, INKED MARGINS FREE IN PLANES OF   SECTIONS EXAMINED WITHOUT RESIDUAL ATYPICAL MELANOCYTIC NEOPLASM SEEN.     F. SKIN, RIGHT BACK INFERIOR STANDING  CONE STITCH BRITT, APEX, EXCISION:   UNREMARKABLE SKIN.     G. RIGHT BACK SUPERIOR STANDING CONE STITCH BRITT, APEX, EXCISION:   UNREMARKABLE SKIN.     Electronically Signed Out By SHERLEY STRICKLAND MD/SALINA   By the signature on  this report, the individual or group listed as making the   Final Interpretation/Diagnosis certifies that they have reviewed this case.     Addendum/Procedures:   Special Oncology Report Date Ordered: 2/2/2022 Status: Signed Out   Date  Complete: 2/2/2022   Date Reported: 2/2/2022     Addendum Diagnosis   TEST: Solid Focus Tumor DNA Panel   SPECIMEN: FFPE, Skin, Back, Right, Biopsy, [id number]   DISEASE DIAGNOSIS: Melanoma   Estimated Tumor Content: 30%   COLLECTION DATE: 12/29/2021   RECEIVED DATE: 01/28/2022   REPORT DATE: 02/02/2022     MICROSATELLITE STATUS: Microsatellite Stable (KATHERIN)   DISEASE ASSOCIATED GENOMIC FINDINGS:   BRAF p.V600E ([id number]: [id number])      Assessment and Plan:   Assessment/Plan   Mrs. Melina Rivera is a 44 y.o. female with a diagnosis of stage IIIB melanoma of the back. WLE/SLNB 01/07/2022. Completed one year of  adjuvant Nivolumab 03/01/2023. Currently on surveillance. Please see the evolution of the case listed above in the cancer history..    CT scan 04/29/2024 showing AALIYAH. Hypo enhancement of the right kidney noted. Creatinine is rising and she is having right flank pain. Patient sent to ED for evaluation. CT angio performed and no acute issue noted. I made a referral for her to meet with Dr. Reina to discuss renal artery stenosis and HTN.      The surveillance plan for stage IIIB melanoma involves  1. Clinical exam and history every 6 months for the first two years and then every 12 months to complete 5 years. RTC 11/01/2024 at [facility]  2. CT scan every 6 months for first two years and then every 12 months to complete 5 years. CT 10/28/2024.  3. Dermatology follow up for skin check.   4. Follow up with Surgical Oncology for Ultrasound of the lymph node basin. I sent her to schedule this today.       Patient can always call me earlier if he notices any changes in his status, or any physician involved in the care feels that the patient needs to be seen by our team.     # Elevated BP   - Checks her BP at home. Running 130/50's.   - Following with her PCP. Last /84.     # Anxiety  - Following with Dr. Trinh.      # Health maintenance  - Follows with PCP for wellness visits and age appropriate cancer screenings.      DISCLAIMER:   In preparing for this visit and writing this note, I reviewed all the previous electronic medical records (labs, imaging and medical charts) of the patient available in the physician portal. Significant findings which helped in decision making are recorded  in this chart.     The plan was discussed with the patient. We gave her ample opportunities to ask questions. All questions were answered to her satisfaction and she verbalized understanding.

## 2024-05-07 NOTE — PATIENT INSTRUCTIONS
Mrs. Melina Rivera ,  It was a pleasure talking to you today.    As discussed, we will meet again in 6 months on 11/01/2024. Please have a CT scan and labs prior on 10/28/2024. Please keep your appointments with dermatology and surgical oncology.     Our offices will be reaching out to you to make all the appointments. I am always available at the phone numbers listed below for an earlier appointment should you wish one.    In case of an emergency please dial 911 or report to your nearest Emergency Room.  For all other questions, please do not hesitate to reach out to us at the number listed below.    Thank you for choosing [facility] at [facility].   We appreciate your visit.     MD Jenae Narvaez, ELOY Gregory RN    Sarcoma and Cutaneous Oncology team    Phone: [phone]  Fax: [phone].

## 2024-05-17 ENCOUNTER — APPOINTMENT (OUTPATIENT)
Dept: PRIMARY CARE | Facility: CLINIC | Age: 44
End: 2024-05-17
Payer: COMMERCIAL

## 2024-05-21 ENCOUNTER — TELEMEDICINE (OUTPATIENT)
Dept: BEHAVIORAL HEALTH | Facility: HOSPITAL | Age: 44
End: 2024-05-21
Payer: COMMERCIAL

## 2024-05-21 DIAGNOSIS — F33.1 MODERATE EPISODE OF RECURRENT MAJOR DEPRESSIVE DISORDER (MULTI): ICD-10-CM

## 2024-05-21 PROCEDURE — 4004F PT TOBACCO SCREEN RCVD TLK: CPT | Performed by: PSYCHIATRY & NEUROLOGY

## 2024-05-21 PROCEDURE — 99214 OFFICE O/P EST MOD 30 MIN: CPT | Performed by: PSYCHIATRY & NEUROLOGY

## 2024-05-21 NOTE — PROGRESS NOTES
"Patient  Melina Rivera is a 44 y.o. female, presented for No chief complaint on file.  .    Patient was seen via virtual visit:   An interactive audio and video telecommunication system which permits real time communications between the patient (at the originating site) and provider (at the distant site) was utilized to provide this telehealth service.   Verbal consent was requested and obtained from Melina Rivera on this date, 05/21/24 for a telehealth visit.       History of presenting Illness:   Reports that she has been doing well. Reports that she has had a few emotional days. Work is making her crazy. She is frustrated at work because she feels like crying. As , she is trying to onboard a new project, she is working with a client who has been working against them, talking condescending towards her. She is realizing that she has been allowing her work frustration outside of work. She had plans to go to her cousin//friend, had to cry out her feelings. States she was able to move on with with her day. Otherwise, she reports that she has been doing well. Reports that mood has been good. Reports that her anxiety and sleep are much improved. Communicating well with family. \"I feel even, nothing new and dramatic ... I feel steady.\"No crying episodes. Had hysterectomy, went well, no issues. Her relationship with her siblings is improved. Has been able to communicate with them better. She is able to acknowledge her emotions.       Not taking lorazepam.     Denies manic or panic symptoms. No psychosis.   Denies gun ownership.                                        Review of Systems  Anxiety: Negative  Depression: negative  Delirium: negative  Psychosis: negative  Danya: negative  Safety Issues: none  Psychiatric ROS Comment: None        Past Psychiatric History:   Previous therapy: yes  Previous psychiatric treatment and medication trials: yes - wellbutrin, lorazepam, mirtazapine  Previous psychiatric " hospitalizations: yes - once in 2011 when had an anger outburst, was admitted at [facility] for a few days.  Previous diagnoses: yes - MDD, PEPE  Previous suicide attempts: no  History of violence: no  Depression screening was performed with standardized tool: Not Screened       Past Medical History  Past Medical History:   Diagnosis Date    Anxiety     Depression     Fibroid     Hypertension     Melanoma of back (Multi) 12/29/2021    SCC (squamous cell carcinoma) 08/25/2023    SCC       Surgical History  Past Surgical History:   Procedure Laterality Date    ANTERIOR CRUCIATE LIGAMENT REPAIR      HYSTERECTOMY  12/21/2023    OTHER SURGICAL HISTORY  11/29/2022    Knee surgery    OTHER SURGICAL HISTORY  11/29/2022    Excision melanoma        Family History:  Family History   Problem Relation Name Age of Onset    Other (gist of colon) Mother      Other (cholangiocarcinoma) Father      Other (papillary carcinoma) Brother      Other (pten gene mutation) Brother      Other (b cell lymphona) Mother's Sister      Throat cancer Mother's Brother      Colon cancer Maternal Grandfather Don     Bilateral breast cancer Paternal Grandmother      Other (myeloid leukemia) Paternal Cousin         Social History:  Social History     Socioeconomic History    Marital status:      Spouse name: Not on file    Number of children: Not on file    Years of education: Not on file    Highest education level: Not on file   Occupational History    Not on file   Tobacco Use    Smoking status: Every Day     Current packs/day: 1.00     Average packs/day: 1 pack/day for 24.4 years (24.4 ttl pk-yrs)     Types: Cigarettes     Start date: 1/1/2000    Smokeless tobacco: Never   Vaping Use    Vaping status: Never Used   Substance and Sexual Activity    Alcohol use: Yes     Alcohol/week: 2.0 standard drinks of alcohol     Types: 2 Standard drinks or equivalent per week    Drug use: Never    Sexual activity: Yes     Partners: Male     Birth  "control/protection: None   Other Topics Concern    Not on file   Social History Narrative    Not on file     Social Determinants of Health     Financial Resource Strain: Not on file   Food Insecurity: Not on file   Transportation Needs: Not on file   Physical Activity: Not on file   Stress: Not on file   Social Connections: Not on file   Intimate Partner Violence: Not on file   Housing Stability: Not on file             Medication:  Current Outpatient Medications   Medication Instructions    albuterol (Ventolin HFA) 90 mcg/actuation inhaler 2 puffs, inhalation, Every 4 hours PRN    LORazepam (ATIVAN) 0.5 mg, oral, 2 times daily PRN    losartan (COZAAR) 100 mg, oral, Daily    mirtazapine (REMERON) 45 mg, oral, Nightly    multivitamin tablet 1 tablet, oral, Daily    QUEtiapine (SEROQUEL) 50 mg, oral, Nightly        Allergies  .No Known Allergies     Vitals:  Visit Vitals  OB Status Having periods   Smoking Status Every Day        Mental Status Exam  General: Appears stated age, dressed appropriately  Appearance: Fair hygiene and grooming.  Attitude: Pleasant  Behavior: Cooperative  Motor Activity: WNL  Speech: Soft, normal rate, rhythm and volume.  Mood: \"good\"  Affect: Congruent  Thought Process: Linear and goal directed  Thought Content: Denies suicidal or homicidal ideas, intent or plans. No IOR or delusions.  Thought Perception: No perceptual abnormalities.  Cognition: Grossly intact  Insight: Intact  Judgement: Intact      Psychiatric Risk Assessment  Violence Risk Assessment: none  Acute Risk of Harm to Others is Considered: low   Suicide Risk Assessment:  and chronic medical illness  Protective Factors against Suicide: child-related concerns/living with children at home < 18 yrs, employment, fear of social disapproval, fear of suicide, hopefulness/future orientation, marriage/partnership, moral objections to suicide, positive family relationships, Religion affiliation/spirituality, sense of " responsibility toward family, social support/connectedness, strong coping skills, and strong therapeutic alliance with provider  Acute Risk of Harm to Self is Considered: low    Labs:  Component      Latest Ref Rng 5/3/2024   LEUKOCYTES (10*3/UL) IN BLOOD BY AUTOMATED COUNT, [place]      4.4 - 11.3 x10*3/uL 12.8 (H)    nRBC      0.0 - 0.0 /100 WBCs 0.0    ERYTHROCYTES (10*6/UL) IN BLOOD BY AUTOMATED COUNT, [place]      4.00 - 5.20 x10*6/uL 4.08    HEMOGLOBIN      12.0 - 16.0 g/dL 13.3    HEMATOCRIT      36.0 - 46.0 % 40.1    MCV      80 - 100 fL 98    MCH      26.0 - 34.0 pg 32.6    MCHC      32.0 - 36.0 g/dL 33.2    RED CELL DISTRIBUTION WIDTH      11.5 - 14.5 % 13.6    PLATELETS (10*3/UL) IN BLOOD AUTOMATED COUNT, [place]      150 - 450 x10*3/uL 335    NEUTROPHILS/100 LEUKOCYTES IN BLOOD BY AUTOMATED COUNT, [place]      40.0 - 80.0 % 63.1    Immature Granulocytes %, Automated      0.0 - 0.9 % 0.5    Lymphocytes %      13.0 - 44.0 % 25.9    Monocytes %      2.0 - 10.0 % 6.3    Eosinophils %      0.0 - 6.0 % 3.4    Basophils %      0.0 - 2.0 % 0.8    NEUTROPHILS (10*3/UL) IN BLOOD BY AUTOMATED COUNT, [place]      1.20 - 7.70 x10*3/uL 8.04 (H)    Immature Granulocytes Absolute, Automated      0.00 - 0.70 x10*3/uL 0.07    Lymphocytes Absolute      1.20 - 4.80 x10*3/uL 3.31    Monocytes Absolute      0.10 - 1.00 x10*3/uL 0.81    Eosinophils Absolute      0.00 - 0.70 x10*3/uL 0.44    Basophils Absolute      0.00 - 0.10 x10*3/uL 0.10    GLUCOSE      65 - 99 mg/dL 100 (H)    SODIUM      133 - 145 mmol/L 141    POTASSIUM      3.4 - 5.1 mmol/L 4.4    CHLORIDE      97 - 107 mmol/L 104    Bicarbonate      24 - 31 mmol/L 26    Blood Urea Nitrogen      8 - 25 mg/dL 17    Creatinine      0.40 - 1.60 mg/dL 1.30    EGFR      >60 mL/min/1.73m*2 51 (L)    Calcium      8.5 - 10.4 mg/dL 9.1    Albumin      3.5 - 5.0 g/dL 4.3    Alkaline Phosphatase      35 - 125 U/L 68    Total Protein      5.9 - 7.9 g/dL 7.0    AST      5 - 40  U/L 34    Bilirubin Total      0.1 - 1.2 mg/dL 0.2    ALT      5 - 40 U/L 40    Anion Gap      <=19 mmol/L 11    Color, Urine      Light-Yellow, Yellow, Dark-Yellow  Colorless ! (N)    Appearance, Urine      Clear  Clear    Specific Gravity, Urine      1.005 - 1.035  1.005    pH, Urine      5.0, 5.5, 6.0, 6.5, 7.0, 7.5, 8.0  6.5    Protein, Urine      NEGATIVE, 10 (TRACE), 20 (TRACE) mg/dL NEGATIVE    Glucose, Urine      Normal mg/dL Normal    Blood, Urine      NEGATIVE  NEGATIVE    Ketones, Urine      NEGATIVE mg/dL NEGATIVE    Bilirubin, Urine      NEGATIVE  NEGATIVE    Urobilinogen, Urine      Normal mg/dL Normal    Nitrite, Urine      NEGATIVE  NEGATIVE    Leukocyte Esterase, Urine      NEGATIVE  NEGATIVE       Legend:  (H) High  (L) Low  ! (N) Normal    Diagnosis:  Problem List Items Addressed This Visit          Mental Health    MDD (major depressive disorder)        Assessment/Plan   Problem List Items Addressed This Visit          Mental Health    MDD (major depressive disorder)       44 yr old  woman with stage IIIB melanoma. BRAF positive, s/p one year of adjuvant treatment with immunotherapy/Nivolumab, CT scan on 01/03/2023 showing stable changes who is referred to psychiatry for depression and anxiety. Presents with sxs c/w MDD with anxious distress in setting of multiple psychoscocial stressors.      She was started on mirtazapine, did well. She is to continue  quetiapine to help with depression, anxiety and sleep. Has not taken any lorazepam since last visit.    Discussed weight gain as side effect of her medication. She is working on diet and exercise. She is planning to starrt working regularly.     PLAN:  1. MDD with anxious distress  - Continue  quetiapine 50mg PO QHS  - Mirtazapine 45mg PO at bedtime  - Lorazepam 0.5mg PO BID PRN anxiety (took it once or twice over past month)  - Provided supportive therapy  - No acute safety issues.    Medication Consent  Medication Consent: no  medication changes necessary for review    Please schedule a follow-up with your PCP for your ongoing medical problems.    Dr. Trinh is in office on Mon- Thu. Phone calls may not be returned until next day I am in office.   For scheduling questions: [phone]  For other questions: [phone]       For [facility], [employer] is a 24/7 hotline that you can call for assistance: [phone].     Please call 9-1-1 or go to the nearest emergency room if you feel worse or have thoughts of hurting yourself or anyone else, or hearing voices, seeing visions or having new or scary thoughts about people around you.    I spent 30 minutes in the professional and overall care of this patient.    Santana Trinh MD

## 2024-06-03 ENCOUNTER — APPOINTMENT (OUTPATIENT)
Dept: PRIMARY CARE | Facility: CLINIC | Age: 44
End: 2024-06-03
Payer: COMMERCIAL

## 2024-06-08 DIAGNOSIS — F33.40 RECURRENT MAJOR DEPRESSIVE DISORDER, IN REMISSION (CMS-HCC): ICD-10-CM

## 2024-06-10 ENCOUNTER — APPOINTMENT (OUTPATIENT)
Dept: PRIMARY CARE | Facility: CLINIC | Age: 44
End: 2024-06-10
Payer: COMMERCIAL

## 2024-06-24 ENCOUNTER — APPOINTMENT (OUTPATIENT)
Dept: PRIMARY CARE | Facility: CLINIC | Age: 44
End: 2024-06-24
Payer: COMMERCIAL

## 2024-06-24 RX ORDER — QUETIAPINE FUMARATE 50 MG/1
50 TABLET, FILM COATED ORAL NIGHTLY
Qty: 30 TABLET | Refills: 2 | Status: SHIPPED | OUTPATIENT
Start: 2024-06-24 | End: 2024-09-22

## 2024-06-24 RX ORDER — IPRATROPIUM BROMIDE 0.5 MG/2.5ML
500 SOLUTION RESPIRATORY (INHALATION)
COMMUNITY
Start: 2023-12-21

## 2024-06-24 RX ORDER — TRIAMCINOLONE ACETONIDE 1 MG/G
CREAM TOPICAL
COMMUNITY
Start: 2024-03-24

## 2024-06-24 RX ORDER — CEPHALEXIN 500 MG/1
TABLET ORAL
COMMUNITY
Start: 2022-11-09

## 2024-06-24 RX ORDER — MUPIROCIN 20 MG/G
OINTMENT TOPICAL
COMMUNITY
Start: 2023-03-08

## 2024-06-24 RX ORDER — LOSARTAN POTASSIUM 50 MG/1
1 TABLET ORAL
COMMUNITY
Start: 2023-08-29

## 2024-06-24 RX ORDER — ALBUTEROL SULFATE 0.83 MG/ML
2.5 SOLUTION RESPIRATORY (INHALATION)
COMMUNITY
Start: 2023-12-21

## 2024-06-24 RX ORDER — ATENOLOL 50 MG/1
TABLET ORAL
COMMUNITY
Start: 2022-11-18

## 2024-06-24 RX ORDER — SODIUM CHLORIDE 9 MG/ML
100 INJECTION, SOLUTION INTRAVENOUS
COMMUNITY
Start: 2023-12-21

## 2024-07-19 PROBLEM — R55 SYNCOPE: Status: RESOLVED | Noted: 2018-05-29 | Resolved: 2024-07-19

## 2024-07-19 PROBLEM — G89.18 POSTOPERATIVE PAIN: Status: ACTIVE | Noted: 2024-07-19

## 2024-07-19 PROBLEM — M25.9 DISORDER OF GLENOHUMERAL JOINT: Status: ACTIVE | Noted: 2018-05-29

## 2024-07-19 PROBLEM — G43.119 CLASSICAL MIGRAINE WITH INTRACTABLE MIGRAINE: Status: ACTIVE | Noted: 2023-08-24

## 2024-07-19 PROBLEM — N93.9 ABNORMAL UTERINE BLEEDING: Status: ACTIVE | Noted: 2024-07-19

## 2024-07-19 PROBLEM — I10 PRIMARY HYPERTENSION: Status: ACTIVE | Noted: 2023-08-29

## 2024-07-19 PROBLEM — F32.9 MAJOR DEPRESSIVE DISORDER: Status: ACTIVE | Noted: 2023-08-24

## 2024-07-19 PROBLEM — D23.5 DYSPLASTIC NEVUS OF TRUNK: Status: ACTIVE | Noted: 2023-08-25

## 2024-07-23 ENCOUNTER — TELEMEDICINE (OUTPATIENT)
Dept: BEHAVIORAL HEALTH | Facility: HOSPITAL | Age: 44
End: 2024-07-23
Payer: COMMERCIAL

## 2024-07-23 DIAGNOSIS — F33.1 MODERATE EPISODE OF RECURRENT MAJOR DEPRESSIVE DISORDER (MULTI): ICD-10-CM

## 2024-07-23 RX ORDER — MIRTAZAPINE 45 MG/1
45 TABLET, FILM COATED ORAL NIGHTLY
Qty: 30 TABLET | Refills: 2 | Status: SHIPPED | OUTPATIENT
Start: 2024-07-23 | End: 2024-10-21

## 2024-07-23 NOTE — PROGRESS NOTES
"Patient  Melina Rivera is a 44 y.o. female, presented for No chief complaint on file.  .    Patient was seen via virtual visit:   An interactive audio and video telecommunication system which permits real time communications between the patient (at the originating site) and provider (at the distant site) was utilized to provide this telehealth service.   Verbal consent was requested and obtained from Melina Rivera on this date, 07/23/24 for a telehealth visit.       History of presenting Illness:   Patient doing well. Reports that mood has been good. Reports that her anxiety and sleep are much improved. Communicating well with family. \"I feel even, nothing new and dramatic ... I feel steady.\"No crying episodes. Had hysterectomy, went well, no issues. Her relationship with her siblings is improved. Has been able to communicate with them better. She is able to acknowledge her emotions.       Not taking lorazepam.     Denies manic or panic symptoms. No psychosis.   Denies gun ownership.       Review of Systems  Anxiety: Negative  Depression: negative  Delirium: negative  Psychosis: negative  Danya: negative  Safety Issues: none  Psychiatric ROS Comment: None        Past Psychiatric History:   Previous therapy: yes  Previous psychiatric treatment and medication trials: yes - wellbutrin, lorazepam, mirtazapine  Previous psychiatric hospitalizations: yes - once in 2011 when had an anger outburst, was admitted at [facility] for a few days.  Previous diagnoses: yes - MDD, PEPE  Previous suicide attempts: no  History of violence: no  Depression screening was performed with standardized tool: Not Screened    Past Medical History  Past Medical History:   Diagnosis Date    Anxiety     Depression     Fibroid     Hypertension     Melanoma of back (Multi) 12/29/2021    SCC (squamous cell carcinoma) 08/25/2023    SCC    Syncope 05/29/2018       Surgical History  Past Surgical History:   Procedure Laterality Date    " ANTERIOR CRUCIATE LIGAMENT REPAIR      HYSTERECTOMY  12/21/2023    OTHER SURGICAL HISTORY  11/29/2022    Knee surgery    OTHER SURGICAL HISTORY  11/29/2022    Excision melanoma        Family History:  Family History   Problem Relation Name Age of Onset    Other (gist of colon) Mother      Other (cholangiocarcinoma) Father      Other (papillary carcinoma) Brother      Other (pten gene mutation) Brother      Other (b cell lymphona) Mother's Sister      Throat cancer Mother's Brother      Colon cancer Maternal Grandfather Don     Bilateral breast cancer Paternal Grandmother      Other (myeloid leukemia) Paternal Cousin         Social History:  Social History     Socioeconomic History    Marital status:      Spouse name: Not on file    Number of children: Not on file    Years of education: Not on file    Highest education level: Not on file   Occupational History    Not on file   Tobacco Use    Smoking status: Every Day     Current packs/day: 1.00     Average packs/day: 1 pack/day for 24.6 years (24.6 ttl pk-yrs)     Types: Cigarettes     Start date: 1/1/2000    Smokeless tobacco: Never   Vaping Use    Vaping status: Never Used   Substance and Sexual Activity    Alcohol use: Yes     Alcohol/week: 2.0 standard drinks of alcohol     Types: 2 Standard drinks or equivalent per week    Drug use: Never    Sexual activity: Yes     Partners: Male     Birth control/protection: None   Other Topics Concern    Not on file   Social History Narrative    Not on file     Social Determinants of Health     Financial Resource Strain: Not on file   Food Insecurity: Not on file   Transportation Needs: Not on file   Physical Activity: Not on file   Stress: Not on file   Social Connections: Not on file   Intimate Partner Violence: Not on file   Housing Stability: Not on file             Medication:  Current Outpatient Medications   Medication Instructions    albuterol (Ventolin HFA) 90 mcg/actuation inhaler 2 puffs, inhalation, Every 4  "hours PRN    albuterol 2.5 mg, inhalation    atenolol (Tenormin) 50 mg tablet oral    cephalexin (Keftab) 500 mg tablet oral    ipratropium (ATROVENT) 500 mcg, inhalation    LORazepam (ATIVAN) 0.5 mg, oral, 2 times daily PRN    losartan (Cozaar) 50 mg tablet 1 tablet, oral, Daily (0630)    losartan (COZAAR) 100 mg, oral, Daily    mirtazapine (REMERON) 45 mg, oral, Nightly    multivitamin tablet 1 tablet, oral, Daily    mupirocin (Bactroban) 2 % ointment Mupirocin 2 % External Ointment Quantity: 22 Refills: 0 Ordered: 8-Mar-2023 DO Start : 8-Mar-2023 Active    QUEtiapine (SEROQUEL) 50 mg, oral, Nightly    sodium chloride 0.9% 100 mL/hr, intravenous    triamcinolone (Kenalog) 0.1 % cream APPLY TO AFFECTED AREAS TWICE A DAY AS NEEDED FLARES        Allergies  .  Allergies   Allergen Reactions    Other Other        Vitals:  Visit Vitals  OB Status Having periods   Smoking Status Every Day        Mental Status Exam  General: Appears stated age, dressed appropriately  Appearance: Fair hygiene and grooming.  Attitude: Pleasant  Behavior: Cooperative  Motor Activity: WNL  Speech: Soft, normal rate, rhythm and volume.  Mood: \"good\"  Affect: Congruent  Thought Process: Linear and goal directed  Thought Content: Denies suicidal or homicidal ideas, intent or plans. No IOR or delusions.  Thought Perception: No perceptual abnormalities.  Cognition: Grossly intact  Insight: Intact  Judgement: Intact    Psychiatric Risk Assessment  Violence Risk Assessment: none  Acute Risk of Harm to Others is Considered: low   Suicide Risk Assessment:  and chronic medical illness  Protective Factors against Suicide: child-related concerns/living with children at home < 18 yrs, employment, fear of social disapproval, fear of suicide, hopefulness/future orientation, marriage/partnership, moral objections to suicide, positive family relationships, Sabianist affiliation/spirituality, sense of responsibility toward family, social " support/connectedness, strong coping skills, and strong therapeutic alliance with provider  Acute Risk of Harm to Self is Considered: low    Labs:  Component      Latest Ref [facility] 5/3/2024   LEUKOCYTES (10*3/UL) IN BLOOD BY AUTOMATED COUNT, [facility]      4.4 - 11.3 x10*3/uL 12.8 (H)    nRBC      0.0 - 0.0 /100 WBCs 0.0    ERYTHROCYTES (10*6/UL) IN BLOOD BY AUTOMATED COUNT, [facility]      4.00 - 5.20 x10*6/uL 4.08    HEMOGLOBIN      12.0 - 16.0 g/dL 13.3    HEMATOCRIT      36.0 - 46.0 % 40.1    MCV      80 - 100 fL 98    MCH      26.0 - 34.0 pg 32.6    MCHC      32.0 - 36.0 g/dL 33.2    RED CELL DISTRIBUTION WIDTH      11.5 - 14.5 % 13.6    PLATELETS (10*3/UL) IN BLOOD AUTOMATED COUNT, [facility]      150 - 450 x10*3/uL 335    NEUTROPHILS/100 LEUKOCYTES IN BLOOD BY AUTOMATED COUNT, [facility]      40.0 - 80.0 % 63.1    Immature Granulocytes %, Automated      0.0 - 0.9 % 0.5    Lymphocytes %      13.0 - 44.0 % 25.9    Monocytes %      2.0 - 10.0 % 6.3    Eosinophils %      0.0 - 6.0 % 3.4    Basophils %      0.0 - 2.0 % 0.8    NEUTROPHILS (10*3/UL) IN BLOOD BY AUTOMATED COUNT, [facility]      1.20 - 7.70 x10*3/uL 8.04 (H)    Immature Granulocytes Absolute, Automated      0.00 - 0.70 x10*3/uL 0.07    Lymphocytes Absolute      1.20 - 4.80 x10*3/uL 3.31    Monocytes Absolute      0.10 - 1.00 x10*3/uL 0.81    Eosinophils Absolute      0.00 - 0.70 x10*3/uL 0.44    Basophils Absolute      0.00 - 0.10 x10*3/uL 0.10    GLUCOSE      65 - 99 mg/dL 100 (H)    SODIUM      133 - 145 mmol/L 141    POTASSIUM      3.4 - 5.1 mmol/L 4.4    CHLORIDE      97 - 107 mmol/L 104    Bicarbonate      24 - 31 mmol/L 26    Blood Urea Nitrogen      8 - 25 mg/dL 17    Creatinine      0.40 - 1.60 mg/dL 1.30    EGFR      >60 mL/min/1.73m*2 51 (L)    Calcium      8.5 - 10.4 mg/dL 9.1    Albumin      3.5 - 5.0 g/dL 4.3    Alkaline Phosphatase      35 - 125 U/L 68    Total Protein      5.9 - 7.9 g/dL 7.0    AST      5 - 40 U/L 34    Bilirubin Total      0.1 -  1.2 mg/dL 0.2    ALT      5 - 40 U/L 40    Anion Gap      <=19 mmol/L 11       Legend:  (H) High  (L) Low    Diagnosis:  Problem List Items Addressed This Visit          Mental Health    Major depressive disorder        Assessment/Plan   Problem List Items Addressed This Visit          Mental Health    Major depressive disorder       44 yr old  woman with stage IIIB melanoma. BRAF positive, s/p one year of adjuvant treatment with immunotherapy/Nivolumab, CT scan on 01/03/2023 showing stable changes who is referred to psychiatry for depression and anxiety. Presents with sxs c/w MDD with anxious distress in setting of multiple psychoscocial stressors.      She was started on mirtazapine, did well. She is to continue  quetiapine to help with depression, anxiety and sleep. Has not taken any lorazepam since last visit.     PLAN:  1. MDD with anxious distress  - Continue  quetiapine 50mg PO QHS  - Mirtazapine 45mg PO at bedtime  - renew lorazepam 0.5mg PO BID PRN anxiety  - Provided supportive therapy  - No acute safety issues.    Medication Consent  Medication Consent: no medication changes necessary for review    Please schedule a follow-up with your PCP for your ongoing medical problems.    Dr. Trinh is in office on Mon- Thu. Phone calls may not be returned until next day I am in office.   For scheduling questions: [phone]  For other questions: [phone]       For [facility], [employer] is a 24/7 hotline that you can call for assistance: [phone].     Please call 9-1-1 or go to the nearest emergency room if you feel worse or have thoughts of hurting yourself or anyone else, or hearing voices, seeing visions or having new or scary thoughts about people around you.    I spent 30 minutes in the professional and overall care of this patient.    Santana Trinh MD

## 2024-09-04 ENCOUNTER — TELEMEDICINE (OUTPATIENT)
Dept: BEHAVIORAL HEALTH | Facility: HOSPITAL | Age: 44
End: 2024-09-04
Payer: COMMERCIAL

## 2024-09-04 DIAGNOSIS — F33.1 MODERATE EPISODE OF RECURRENT MAJOR DEPRESSIVE DISORDER (MULTI): ICD-10-CM

## 2024-09-04 PROCEDURE — 99213 OFFICE O/P EST LOW 20 MIN: CPT | Performed by: PSYCHIATRY & NEUROLOGY

## 2024-09-04 PROCEDURE — 4004F PT TOBACCO SCREEN RCVD TLK: CPT | Performed by: PSYCHIATRY & NEUROLOGY

## 2024-09-06 NOTE — PROGRESS NOTES
"Patient  Melina Rivera is a 44 y.o. female, presented for No chief complaint on file.  .    Patient was seen via virtual visit:   An interactive audio and video telecommunication system which permits real time communications between the patient (at the originating site) and provider (at the distant site) was utilized to provide this telehealth service.   Verbal consent was requested and obtained from Melina Rivera on this date, 09/06/24 for a telehealth visit.       History of presenting Illness:   Patient doing well. Reports that she does not feel depressed, at all. Reports depression is 0/10, where 10= worst depression and 0=no depression.Reports that mood has been good. Reports that her anxiety and sleep are much improved. Communicating well with family. \"I feel even, nothing new and dramatic ... I feel steady.\"No crying episodes. Had hysterectomy, went well, no issues. Her relationship with her siblings is improved. Has been able to communicate with them better. She is able to acknowledge her emotions.       Not taking lorazepam.     Denies manic or panic symptoms. No psychosis.   Denies gun ownership.       Review of Systems  Anxiety: Negative  Depression: negative  Delirium: negative  Psychosis: negative  Danya: negative  Safety Issues: none  Psychiatric ROS Comment: None        Past Psychiatric History:   Previous therapy: yes  Previous psychiatric treatment and medication trials: yes - wellbutrin, lorazepam, mirtazapine  Previous psychiatric hospitalizations: yes - once in 2011 when had an anger outburst, was admitted at [facility] for a few days.  Previous diagnoses: yes - MDD, PEPE  Previous suicide attempts: no  History of violence: no  Depression screening was performed with standardized tool: Not Screened    Past Medical History  Past Medical History:   Diagnosis Date    Anxiety     Depression     Fibroid     Hypertension     Melanoma of back (Multi) 12/29/2021    SCC (squamous cell carcinoma) " 08/25/2023    SCC    Syncope 05/29/2018       Surgical History  Past Surgical History:   Procedure Laterality Date    ANTERIOR CRUCIATE LIGAMENT REPAIR      HYSTERECTOMY  12/21/2023    OTHER SURGICAL HISTORY  11/29/2022    Knee surgery    OTHER SURGICAL HISTORY  11/29/2022    Excision melanoma        Family History:  Family History   Problem Relation Name Age of Onset    Other (gist of colon) Mother      Other (cholangiocarcinoma) Father      Other (papillary carcinoma) Brother      Other (pten gene mutation) Brother      Other (b cell lymphona) Mother's Sister      Throat cancer Mother's Brother      Colon cancer Maternal Grandfather Don     Bilateral breast cancer Paternal Grandmother      Other (myeloid leukemia) Paternal Cousin         Social History:  Social History     Socioeconomic History    Marital status:      Spouse name: Not on file    Number of children: Not on file    Years of education: Not on file    Highest education level: Not on file   Occupational History    Not on file   Tobacco Use    Smoking status: Every Day     Current packs/day: 1.00     Average packs/day: 1 pack/day for 24.7 years (24.7 ttl pk-yrs)     Types: Cigarettes     Start date: 1/1/2000    Smokeless tobacco: Never   Vaping Use    Vaping status: Never Used   Substance and Sexual Activity    Alcohol use: Yes     Alcohol/week: 2.0 standard drinks of alcohol     Types: 2 Standard drinks or equivalent per week    Drug use: Never    Sexual activity: Yes     Partners: Male     Birth control/protection: None   Other Topics Concern    Not on file   Social History Narrative    Not on file     Social Determinants of Health     Financial Resource Strain: Not on file   Food Insecurity: Not on file   Transportation Needs: Not on file   Physical Activity: Not on file   Stress: Not on file   Social Connections: Not on file   Intimate Partner Violence: Not on file   Housing Stability: Not on file               Medication:  Current Outpatient  "Medications   Medication Instructions    albuterol (Ventolin HFA) 90 mcg/actuation inhaler 2 puffs, inhalation, Every 4 hours PRN    albuterol 2.5 mg, inhalation    atenolol (Tenormin) 50 mg tablet oral    cephalexin (Keftab) 500 mg tablet oral    ipratropium (ATROVENT) 500 mcg, inhalation    LORazepam (ATIVAN) 0.5 mg, oral, 2 times daily PRN    losartan (Cozaar) 50 mg tablet 1 tablet, oral, Daily (0630)    losartan (COZAAR) 100 mg, oral, Daily    mirtazapine (REMERON) 45 mg, oral, Nightly    multivitamin tablet 1 tablet, oral, Daily    mupirocin (Bactroban) 2 % ointment Mupirocin 2 % External Ointment Quantity: 22 Refills: 0 Ordered: 8-Mar-2023 DO Start : 8-Mar-2023 Active    QUEtiapine (SEROQUEL) 50 mg, oral, Nightly    sodium chloride 0.9% 100 mL/hr, intravenous    triamcinolone (Kenalog) 0.1 % cream APPLY TO AFFECTED AREAS TWICE A DAY AS NEEDED FLARES        Allergies  .  Allergies   Allergen Reactions    Other Other        Vitals:  Visit Vitals  OB Status Having periods   Smoking Status Every Day        Mental Status Exam  General: Appears stated age, dressed appropriately  Appearance: Fair hygiene and grooming.  Attitude: Pleasant  Behavior: Cooperative  Motor Activity: WNL  Speech: Soft, normal rate, rhythm and volume.  Mood: \"good\"  Affect: Congruent  Thought Process: Linear and goal directed  Thought Content: Denies suicidal or homicidal ideas, intent or plans. No IOR or delusions.  Thought Perception: No perceptual abnormalities.  Cognition: Grossly intact  Insight: Intact  Judgement: Intact     Psychiatric Risk Assessment  Violence Risk Assessment: none  Acute Risk of Harm to Others is Considered: low   Suicide Risk Assessment:  and chronic medical illness  Protective Factors against Suicide: child-related concerns/living with children at home < 18 yrs, employment, fear of social disapproval, fear of suicide, hopefulness/future orientation, marriage/partnership, moral objections to suicide, " positive family relationships, [employer]an affiliation/spirituality, sense of responsibility toward family, social support/connectedness, strong coping skills, and strong therapeutic alliance with provider  Acute Risk of Harm to Self is Considered: low        Diagnosis:  Problem List Items Addressed This Visit          Mental Health    Major depressive disorder        Assessment/Plan   Problem List Items Addressed This Visit          Mental Health    Major depressive disorder       44 yr old  woman with stage IIIB melanoma. BRAF positive, s/p one year of adjuvant treatment with immunotherapy/Nivolumab, CT scan on 01/03/2023 showing stable changes who is referred to psychiatry for depression and anxiety. Presents with sxs c/w MDD with anxious distress in setting of multiple psychoscocial stressors.      She was started on mirtazapine, did well. She is to continue  quetiapine to help with depression, anxiety and sleep. Has not taken any lorazepam since last visit.     PLAN:  1. MDD with anxious distress  - Continue  quetiapine 50mg PO QHS  - Mirtazapine 45mg PO at bedtime  - renew lorazepam 0.5mg PO BID PRN anxiety  - Provided supportive therapy  - No acute safety issues.    Medication Consent  Medication Consent: no medication changes necessary for review    Please schedule a follow-up with your PCP for your ongoing medical problems.    Dr. Trinh is in office on Mon- Thu. Phone calls may not be returned until next day I am in office.   For scheduling questions: [phone]  For other questions: [phone]       For [facility] residents, [employer] is a 24/7 hotline that you can call for assistance: [phone].     Please call 9-1-1 or go to the nearest emergency room if you feel worse or have thoughts of hurting yourself or anyone else, or hearing voices, seeing visions or having new or scary thoughts about people around you.    I spent 20 minutes in the professional and overall care of this  patient.    Santana Trinh MD

## 2024-09-09 ENCOUNTER — OFFICE VISIT (OUTPATIENT)
Dept: NEPHROLOGY | Facility: HOSPITAL | Age: 44
End: 2024-09-09
Payer: COMMERCIAL

## 2024-09-09 VITALS
RESPIRATION RATE: 16 BRPM | WEIGHT: 179.5 LBS | BODY MASS INDEX: 33.92 KG/M2 | TEMPERATURE: 96.8 F | HEART RATE: 87 BPM | OXYGEN SATURATION: 94 % | DIASTOLIC BLOOD PRESSURE: 95 MMHG | SYSTOLIC BLOOD PRESSURE: 149 MMHG

## 2024-09-09 DIAGNOSIS — I70.1 RENAL ARTERY STENOSIS (CMS-HCC): ICD-10-CM

## 2024-09-09 DIAGNOSIS — I10 PRIMARY HYPERTENSION: ICD-10-CM

## 2024-09-09 PROCEDURE — 3080F DIAST BP >= 90 MM HG: CPT | Performed by: INTERNAL MEDICINE

## 2024-09-09 PROCEDURE — 99204 OFFICE O/P NEW MOD 45 MIN: CPT | Performed by: INTERNAL MEDICINE

## 2024-09-09 PROCEDURE — 3077F SYST BP >= 140 MM HG: CPT | Performed by: INTERNAL MEDICINE

## 2024-09-09 PROCEDURE — 99214 OFFICE O/P EST MOD 30 MIN: CPT | Performed by: INTERNAL MEDICINE

## 2024-09-09 RX ORDER — IRBESARTAN 300 MG/1
300 TABLET ORAL DAILY
Qty: 90 TABLET | Refills: 3 | Status: SHIPPED | OUTPATIENT
Start: 2024-09-09 | End: 2025-09-09

## 2024-09-09 ASSESSMENT — PAIN SCALES - GENERAL: PAINLEVEL_OUTOF10: 0-NO PAIN

## 2024-09-09 NOTE — PROGRESS NOTES
Melina Rivera   44 y.o.    @@  [facility]/Room: [id number]/Room/bed info not found    Subjective:   The patient is referred for atrophic R kidney.      Meds:   Current Outpatient Medications   Medication Sig Dispense Refill    albuterol 2.5 mg /3 mL (0.083 %) nebulizer solution Inhale 3 mL (2.5 mg).      atenolol (Tenormin) 50 mg tablet Take by mouth.      mirtazapine (Remeron) 45 mg tablet Take 1 tablet (45 mg) by mouth once daily at bedtime. 30 tablet 2    multivitamin tablet Take 1 tablet by mouth once daily.      QUEtiapine (SEROquel) 50 mg tablet TAKE 1 TABLET (50 MG) BY MOUTH ONCE DAILY AT BEDTIME. 30 tablet 2    irbesartan (Avapro) 300 mg tablet Take 1 tablet (300 mg) by mouth once daily. 90 tablet 3    LORazepam (Ativan) 0.5 mg tablet Take 1 tablet (0.5 mg) by mouth 2 times a day as needed for anxiety. 60 tablet 2     No current facility-administered medications for this visit.          ROS:  The patient is awake and oriented. No dizziness or lightheadedness. No chills and no fever. No headaches. No nausea and no vomiting. No shortness of breath. No cough. No sputum. No chest pain. No chest tightness. No abdominal pain. No diarrhea and no constipation. No hematemesis or hemoptysis. No hematuria. No rectal bleeding. No melena. No epistaxis. No urinary symptoms. No flank pain. No leg edema. No leg pain. No weakness. No itching. Overall, the rest of the review of systems is also negative.  12 point review of systems otherwise negative as stated in HPI.        Physical Examination:        Vitals:    09/09/24 0843   BP: (!) 149/95   Pulse: 87   Resp: 16   Temp: 36 °C (96.8 °F)   SpO2: 94%     General: The patient is awake, oriented, and is not in any distress.  Head and Neck: Normocephalic. No periorbital edema.  Eyes: non-icteric  Respiratory: Symmetric air entry. Symmetric chest expansion.No respiratory distress.  Cardiovascular: Symmetric peripheral pulses.  Skin: No maculopapular rash.  Abdomen: soft,  nt/nd  Musculoskeletal: No peripheral edema in both left and right upper extremities.  No edema in either left or right lower extremities.  Neuro Exam: Speech is fluent. Moves extremities.    Imaging:  === 06/02/23 ===    US EXTREMITY NONVASCULAR REAL TIME WITH IMAGE DOCUMENTATION LIMITED ANATOMIC SPECIFIC    - Impression -  Stable examination with lymph nodes seen in the right axilla as well  as in the left inguinal region. No new or growing lymph nodes. All  appear to have a fatty hilum. No abnormal morphology       Blood Labs:  No results found for this or any previous visit (from the past 24 hour(s)).   Lab Results   Component Value Date    PROTUR NEGATIVE 05/03/2024      Lab Results   Component Value Date    GLUCOSE 100 (H) 05/03/2024    CALCIUM 9.1 05/03/2024     05/03/2024    K 4.4 05/03/2024    CO2 26 05/03/2024     05/03/2024    BUN 17 05/03/2024    CREATININE 1.30 05/03/2024         Assessment and Plan:    43 y/o F with stage IIIB melanoma. BRAF positive. She is on Nivolumab 480mg IV every 4 weeks. Started treatment on March 31, 2022. She completed the treatment and the last dose was given on 3/1/2023.  She had a recent CAT scan which showed stenosis of right renal artery and also atrophic right kidney and because of that she is referred to my office for evaluation and workup.    1.  Atrophic right kidney.  She had a CT angio in May 2024 which showed mildly atrophic right kidney.  As per report atrophy is mostly superior pole of the kidney and inferior pole remains relatively normal.  There was no suggestion of infarct.  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  I explained to the patient that these kind of changes does not happen suddenly and this is something which has been ongoing process for several years.  Her last creatinine level from May 2024  is 1.3 which is higher than her baseline which is usually somewhere around 0.8.  I asked for a kidney ultrasound as well as microscopic urinalysis and spot urine protein to creatinine ratio.  Basic metabolic panel will be repeated.    2.  Hypertension.  Blood pressure is high.  I replaced her losartan with irbesartan.    I will see her in about 3 to 4 weeks for follow-up.        Florian Reina MD  Senior Attending Physician  Director of Onco-Nephrology Program  Division of Nephrology & Hypertension  [facility]

## 2024-10-28 ENCOUNTER — HOSPITAL ENCOUNTER (OUTPATIENT)
Dept: RADIOLOGY | Facility: CLINIC | Age: 44
End: 2024-10-28

## 2024-11-01 ENCOUNTER — APPOINTMENT (OUTPATIENT)
Dept: RADIOLOGY | Facility: HOSPITAL | Age: 44
End: 2024-11-01

## 2024-11-01 ENCOUNTER — HOSPITAL ENCOUNTER (EMERGENCY)
Facility: HOSPITAL | Age: 44
Discharge: HOME | End: 2024-11-01

## 2024-11-01 VITALS
SYSTOLIC BLOOD PRESSURE: 152 MMHG | HEART RATE: 85 BPM | TEMPERATURE: 98.1 F | HEIGHT: 60 IN | BODY MASS INDEX: 35.19 KG/M2 | DIASTOLIC BLOOD PRESSURE: 100 MMHG | RESPIRATION RATE: 18 BRPM | WEIGHT: 179.23 LBS | OXYGEN SATURATION: 96 %

## 2024-11-01 DIAGNOSIS — S42.451A CLOSED FRACTURE OF CAPITULUM OF RIGHT HUMERUS, INITIAL ENCOUNTER: Primary | ICD-10-CM

## 2024-11-01 PROCEDURE — 73090 X-RAY EXAM OF FOREARM: CPT | Mod: RIGHT SIDE | Performed by: RADIOLOGY

## 2024-11-01 PROCEDURE — 73200 CT UPPER EXTREMITY W/O DYE: CPT | Mod: RT

## 2024-11-01 PROCEDURE — 73080 X-RAY EXAM OF ELBOW: CPT | Mod: RT

## 2024-11-01 PROCEDURE — 2500000004 HC RX 250 GENERAL PHARMACY W/ HCPCS (ALT 636 FOR OP/ED)

## 2024-11-01 PROCEDURE — 73030 X-RAY EXAM OF SHOULDER: CPT | Mod: RT

## 2024-11-01 PROCEDURE — 73090 X-RAY EXAM OF FOREARM: CPT | Mod: RT

## 2024-11-01 PROCEDURE — 73200 CT UPPER EXTREMITY W/O DYE: CPT | Mod: RIGHT SIDE | Performed by: RADIOLOGY

## 2024-11-01 PROCEDURE — 96372 THER/PROPH/DIAG INJ SC/IM: CPT

## 2024-11-01 PROCEDURE — 73060 X-RAY EXAM OF HUMERUS: CPT | Mod: RT

## 2024-11-01 PROCEDURE — 73080 X-RAY EXAM OF ELBOW: CPT | Mod: RIGHT SIDE | Performed by: RADIOLOGY

## 2024-11-01 PROCEDURE — 29105 APPLICATION LONG ARM SPLINT: CPT

## 2024-11-01 PROCEDURE — 2500000001 HC RX 250 WO HCPCS SELF ADMINISTERED DRUGS (ALT 637 FOR MEDICARE OP)

## 2024-11-01 PROCEDURE — 73060 X-RAY EXAM OF HUMERUS: CPT | Mod: RIGHT SIDE | Performed by: RADIOLOGY

## 2024-11-01 PROCEDURE — 99284 EMERGENCY DEPT VISIT MOD MDM: CPT | Mod: 25

## 2024-11-01 PROCEDURE — 73030 X-RAY EXAM OF SHOULDER: CPT | Mod: RIGHT SIDE | Performed by: RADIOLOGY

## 2024-11-01 RX ORDER — KETOROLAC TROMETHAMINE 30 MG/ML
30 INJECTION, SOLUTION INTRAMUSCULAR; INTRAVENOUS ONCE
Status: COMPLETED | OUTPATIENT
Start: 2024-11-01 | End: 2024-11-01

## 2024-11-01 RX ORDER — OXYCODONE AND ACETAMINOPHEN 5; 325 MG/1; MG/1
1 TABLET ORAL EVERY 6 HOURS PRN
Qty: 12 TABLET | Refills: 0 | Status: SHIPPED | OUTPATIENT
Start: 2024-11-01 | End: 2024-11-04

## 2024-11-01 RX ORDER — HYDROMORPHONE HYDROCHLORIDE 1 MG/ML
0.6 INJECTION, SOLUTION INTRAMUSCULAR; INTRAVENOUS; SUBCUTANEOUS ONCE
Status: COMPLETED | OUTPATIENT
Start: 2024-11-01 | End: 2024-11-01

## 2024-11-01 RX ORDER — OXYCODONE AND ACETAMINOPHEN 5; 325 MG/1; MG/1
2 TABLET ORAL ONCE
Status: COMPLETED | OUTPATIENT
Start: 2024-11-01 | End: 2024-11-01

## 2024-11-01 RX ORDER — IBUPROFEN 800 MG/1
800 TABLET ORAL 3 TIMES DAILY
Qty: 21 TABLET | Refills: 0 | Status: SHIPPED | OUTPATIENT
Start: 2024-11-01 | End: 2024-11-08

## 2024-11-01 ASSESSMENT — PAIN DESCRIPTION - DESCRIPTORS: DESCRIPTORS: THROBBING;SHARP;TIGHTNESS

## 2024-11-01 ASSESSMENT — COLUMBIA-SUICIDE SEVERITY RATING SCALE - C-SSRS
6. HAVE YOU EVER DONE ANYTHING, STARTED TO DO ANYTHING, OR PREPARED TO DO ANYTHING TO END YOUR LIFE?: NO
1. IN THE PAST MONTH, HAVE YOU WISHED YOU WERE DEAD OR WISHED YOU COULD GO TO SLEEP AND NOT WAKE UP?: NO
2. HAVE YOU ACTUALLY HAD ANY THOUGHTS OF KILLING YOURSELF?: NO

## 2024-11-01 ASSESSMENT — PAIN DESCRIPTION - ORIENTATION: ORIENTATION: LEFT;RIGHT

## 2024-11-01 ASSESSMENT — PAIN - FUNCTIONAL ASSESSMENT
PAIN_FUNCTIONAL_ASSESSMENT: 0-10
PAIN_FUNCTIONAL_ASSESSMENT: 0-10

## 2024-11-01 ASSESSMENT — PAIN SCALES - GENERAL
PAINLEVEL_OUTOF10: 9
PAINLEVEL_OUTOF10: 8

## 2024-11-01 ASSESSMENT — PAIN DESCRIPTION - FREQUENCY: FREQUENCY: CONSTANT/CONTINUOUS

## 2024-11-01 ASSESSMENT — PAIN DESCRIPTION - LOCATION: LOCATION: ARM

## 2024-11-04 ENCOUNTER — APPOINTMENT (OUTPATIENT)
Dept: RADIOLOGY | Facility: HOSPITAL | Age: 44
End: 2024-11-04
Payer: COMMERCIAL

## 2024-11-04 ENCOUNTER — APPOINTMENT (OUTPATIENT)
Dept: NEPHROLOGY | Facility: HOSPITAL | Age: 44
End: 2024-11-04
Payer: COMMERCIAL

## 2024-11-05 ENCOUNTER — APPOINTMENT (OUTPATIENT)
Dept: HEMATOLOGY/ONCOLOGY | Facility: HOSPITAL | Age: 44
End: 2024-11-05

## 2024-11-05 ENCOUNTER — APPOINTMENT (OUTPATIENT)
Dept: ORTHOPEDIC SURGERY | Facility: CLINIC | Age: 44
End: 2024-11-05

## 2024-11-05 DIAGNOSIS — G89.18 ACUTE POST-OPERATIVE PAIN: Primary | ICD-10-CM

## 2024-11-05 DIAGNOSIS — S42.451A CLOSED FRACTURE OF CAPITULUM OF RIGHT HUMERUS, INITIAL ENCOUNTER: ICD-10-CM

## 2024-11-05 DIAGNOSIS — S42.451A CLOSED FRACTURE OF CAPITELLUM OF DISTAL HUMERUS, RIGHT, INITIAL ENCOUNTER: ICD-10-CM

## 2024-11-05 PROCEDURE — 4004F PT TOBACCO SCREEN RCVD TLK: CPT | Performed by: ORTHOPAEDIC SURGERY

## 2024-11-05 PROCEDURE — 99204 OFFICE O/P NEW MOD 45 MIN: CPT | Performed by: ORTHOPAEDIC SURGERY

## 2024-11-05 PROCEDURE — A4565 SLINGS: HCPCS | Performed by: ORTHOPAEDIC SURGERY

## 2024-11-05 RX ORDER — HYDROCODONE BITARTRATE AND ACETAMINOPHEN 5; 325 MG/1; MG/1
1 TABLET ORAL EVERY 6 HOURS PRN
Qty: 18 TABLET | Refills: 0 | Status: SHIPPED | OUTPATIENT
Start: 2024-11-05 | End: 2024-11-11

## 2024-11-05 NOTE — LETTER
November 6, 2024     Ana Herrera MD  [address]  [address]  [place] [address]    Patient: Melina Rivera   YOB: 1980   Date of Visit: 11/5/2024       Dear Dr. Ana Herrera MD:    Thank you for referring Melina Rivera to me for evaluation. Below are my notes for this consultation.  If you have questions, please do not hesitate to call me. I look forward to following your patient along with you.       Sincerely,     Lee Laureano MD      CC: Kelby Baker PA-C  ______________________________________________________________________________________    CHIEF COMPLAINT         R elbow pain     ASSESSMENT + PLAN    Displaced right capitellum shear fracture    I reviewed the nature of the injury and the expected time course of healing.  I discussed my recommendation for surgical reduction and fixation of the fracture in order to unlock elbow motion.  I reviewed the major risks and benefits of the surgery.  She does want to proceed, and this will be set up for next week under a general anesthetic and a block, at the location of her convenience.  A new splint was applied today.    Contact my office in the meantime with any interval concerns.      HISTORY OF PRESENT ILLNESS       Patient is a 44 y.o. R-hand dominant female , who presents today for evaluation of R elbow fracture after fall running over curb on [address] holding a bag of candy in attempts to see a child in a [facility]. She was seen in the [facility] ED where imaging was obtained showing a displaced R capitellum fracture. She was placed in a posterior long-arm splint and instructed to follow up OP. She has pain today and has been taking Ibuprofen 800 with some relief. Swelling about the hand and soreness through whole arm. No numbness, tingling, or weakness in hand and fingers.  No previous difficulty with his elbow.    She is not diabetic or hypothyroid.  She does smoke.      REVIEW OF SYSTEMS       A 30-item multi-system  Review Of Systems was obtained on today's intake form.  This was reviewed with the patient and is correct.  The pertinent positives and negatives are listed above.  The form has been scanned separately into the medical record.      PHYSICAL EXAM    Constitutional:    Appears stated age. Well-developed and well-nourished obese female in no acute distress  Psychiatric:         Pleasant normal mood and affect. Behavior is appropriate for the situation.   Head:                   Normocephalic and atraumatic.  Eyes:                    Pupils are equal and round.  Cardiovascular:  2+ radial and ulnar pulses. Fingers well-perfused.  Respiratory:        Effort normal. No respiratory distress. Speaking in complete sentences.  Neurologic:       Alert and oriented to person, place, and time.  Skin:                Skin is intact, warm and dry.  Hematologic / Lymphatic:    No lymphedema or lymphangitis.    Extremities / Musculoskeletal:                      R upper extremity:  Splint removed.  - Skin intact, swelling in hand and fingers.  Mild lateral ecchymosis.  - Tender to palpation over R elbow laterally  - Limited elbow ROM at just 50 to 80 degrees.  Intact pronation and supination.  - Motor and sensation intact M/U/R distributions.  - Cap refill < 2 seconds in all digits.        IMAGING / LABS / EMGs           Xrays and CT scan of R elbow from [facility] were independently interpreted by me today and confirm a capitellum fracture displaced proximally and rotated 180 degrees.  No significant comminution or trochelar invovlement.  Joint is otherwise concentric and well-preserved.    Past Medical History:   Diagnosis Date   • Anxiety    • Depression    • Fibroid    • Hypertension    • Melanoma of back (Multi) 12/29/2021   • SCC (squamous cell carcinoma) 08/25/2023    SCC   • Syncope 05/29/2018       Medication Documentation Review Audit       Reviewed by Rita Vieyra CMA (Medical Assistant) on 11/05/24 at 6366       Medication Order Taking? Sig Documenting Provider Last Dose Status   albuterol 2.5 mg /3 mL (0.083 %) nebulizer solution [id number] No Inhale 3 mL (2.5 mg). Historical Provider, MD Taking Active   atenolol (Tenormin) 50 mg tablet [id number] No Take by mouth. Historical Provider, MD Taking Active   ibuprofen 800 mg tablet [id number]  Take 1 tablet (800 mg) by mouth 3 times a day for 7 days. Kelby Baker PA-C  Active   irbesartan (Avapro) 300 mg tablet [id number]  Take 1 tablet (300 mg) by mouth once daily. Florian Reina MD  Active   LORazepam (Ativan) 0.5 mg tablet [id number] No Take 1 tablet (0.5 mg) by mouth 2 times a day as needed for anxiety. Santana Trinh MD Taking  24   mirtazapine (Remeron) 45 mg tablet [id number] No Take 1 tablet (45 mg) by mouth once daily at bedtime. Santana Trinh MD Taking  10/21/24 235   multivitamin tablet [id number] No Take 1 tablet by mouth once daily. Historical Provider, MD Taking Active   oxyCODONE-acetaminophen (Percocet) 5-325 mg tablet [id number]  Take 1 tablet by mouth every 6 hours if needed for severe pain (7 - 10) for up to 3 days. Kelby Baker PA-C   24   QUEtiapine (SEROquel) 50 mg tablet [id number] No TAKE 1 TABLET (50 MG) BY MOUTH ONCE DAILY AT BEDTIME. Santana Trinh MD Taking  24 235                    No Known Allergies    Social History     Socioeconomic History   • Marital status:      Spouse name: Not on file   • Number of children: Not on file   • Years of education: Not on file   • Highest education level: Not on file   Occupational History   • Not on file   Tobacco Use   • Smoking status: Every Day     Current packs/day: 1.00     Average packs/day: 1 pack/day for 24.8 years (24.8 ttl pk-yrs)     Types: Cigarettes     Start date: 2000   • Smokeless tobacco: Never   Vaping Use   • Vaping status: Never Used   Substance and Sexual Activity   • Alcohol use: Yes     Alcohol/week: 2.0 standard drinks of  alcohol     Types: 2 Standard drinks or equivalent per week   • Drug use: Never   • Sexual activity: Yes     Partners: Male     Birth control/protection: None   Other Topics Concern   • Not on file   Social History Narrative   • Not on file     Social Drivers of Health     Financial Resource Strain: Not on file   Food Insecurity: Not on file   Transportation Needs: Not on file   Physical Activity: Not on file   Stress: Not on file   Social Connections: Not on file   Intimate Partner Violence: Not on file   Housing Stability: Not on file       Past Surgical History:   Procedure Laterality Date   • ANTERIOR CRUCIATE LIGAMENT REPAIR     • HYSTERECTOMY  12/21/2023   • OTHER SURGICAL HISTORY  11/29/2022    Knee surgery   • OTHER SURGICAL HISTORY  11/29/2022    Excision melanoma       Dev Saeed MD    SURGICAL INDICATION    I reviewed the options for further management of this condition and the likely success rates of each.  The patient feels that they have maximized the benefits of conservative care, and they do want to go on to surgery.    I reviewed the major risks of surgery including infection; scarring; damage to nerves, tendons, or vessels; stiffness; nonunion, malunion, progressive arthritis, hardware issues, and wound healing problems, as well as anesthesia risks.  I answered their questions to their satisfaction.  They were given my contact information and will contact the office when they are ready to schedule an exact surgical date.  Surgery will be posted as follows :    Dx :          Displaced right capitellum fracture  ICD-10 :      s42.451a  Procedure :     ORIF right capitellum fracture  CPT :        40001  Anesth :    General Plus block  Location :   [facility]  Duration :    90 minutes  Specials :    [employer] headless screw set  PAT :       No  Post-Op Visit :    10-15 days        ATTESTATION    I saw and evaluated the patient. I personally obtained the key and critical portions of the  history and physical exam or was physically present for key and critical portions performed by the resident/fellow. I reviewed the resident/fellow's documentation and discussed the patient with the resident/fellow. I agree with the resident/fellow's medical decision making as documented in the note.        Electronically signed  IGNACIO Laureano MD  [phone]

## 2024-11-05 NOTE — H&P (VIEW-ONLY)
CHIEF COMPLAINT         R elbow pain     ASSESSMENT + PLAN    Displaced right capitellum shear fracture    I reviewed the nature of the injury and the expected time course of healing.  I discussed my recommendation for surgical reduction and fixation of the fracture in order to unlock elbow motion.  I reviewed the major risks and benefits of the surgery.  She does want to proceed, and this will be set up for next week under a general anesthetic and a block, at the location of her convenience.  A new splint was applied today.    Contact my office in the meantime with any interval concerns.      HISTORY OF PRESENT ILLNESS       Patient is a 44 y.o. R-hand dominant female , who presents today for evaluation of R elbow fracture after fall running over curb on [address] holding a bag of candy in attempts to see a child in a [facility]. She was seen in the [facility] ED where imaging was obtained showing a displaced R capitellum fracture. She was placed in a posterior long-arm splint and instructed to follow up OP. She has pain today and has been taking Ibuprofen 800 with some relief. Swelling about the hand and soreness through whole arm. No numbness, tingling, or weakness in hand and fingers.  No previous difficulty with his elbow.    She is not diabetic or hypothyroid.  She does smoke.      REVIEW OF SYSTEMS       A 30-item multi-system Review Of Systems was obtained on today's intake form.  This was reviewed with the patient and is correct.  The pertinent positives and negatives are listed above.  The form has been scanned separately into the medical record.      PHYSICAL EXAM    Constitutional:    Appears stated age. Well-developed and well-nourished obese female in no acute distress  Psychiatric:         Pleasant normal mood and affect. Behavior is appropriate for the situation.   Head:                   Normocephalic and atraumatic.  Eyes:                    Pupils are equal and round.  Cardiovascular:  2+ radial  and ulnar pulses. Fingers well-perfused.  Respiratory:        Effort normal. No respiratory distress. Speaking in complete sentences.  Neurologic:       Alert and oriented to person, place, and time.  Skin:                Skin is intact, warm and dry.  Hematologic / Lymphatic:    No lymphedema or lymphangitis.    Extremities / Musculoskeletal:                      R upper extremity:  Splint removed.  - Skin intact, swelling in hand and fingers.  Mild lateral ecchymosis.  - Tender to palpation over R elbow laterally  - Limited elbow ROM at just 50 to 80 degrees.  Intact pronation and supination.  - Motor and sensation intact M/U/R distributions.  - Cap refill < 2 seconds in all digits.        IMAGING / LABS / EMGs           Xrays and CT scan of R elbow from [facility] were independently interpreted by me today and confirm a capitellum fracture displaced proximally and rotated 180 degrees.  No significant comminution or trochelar invovlement.  Joint is otherwise concentric and well-preserved.    Past Medical History:   Diagnosis Date    Anxiety     Depression     Fibroid     Hypertension     Melanoma of back (Multi) 12/29/2021    SCC (squamous cell carcinoma) 08/25/2023    SCC    Syncope 05/29/2018       Medication Documentation Review Audit       Reviewed by Rita Vieyra CMA (Medical Assistant) on 11/05/24 at 1419      Medication Order Taking? Sig Documenting Provider Last Dose Status   albuterol 2.5 mg /3 mL (0.083 %) nebulizer solution [id number] No Inhale 3 mL (2.5 mg). Historical Provider, MD Taking Active   atenolol (Tenormin) 50 mg tablet [id number] No Take by mouth. Historical Provider, MD Taking Active   ibuprofen 800 mg tablet [id number]  Take 1 tablet (800 mg) by mouth 3 times a day for 7 days. Kelby Baker PA-C  Active   irbesartan (Avapro) 300 mg tablet [id number]  Take 1 tablet (300 mg) by mouth once daily. Florian Reina MD  Active   LORazepam (Ativan) 0.5 mg tablet [id number] No Take 1 tablet (0.5 mg)  by mouth 2 times a day as needed for anxiety. Santana Trinh MD Taking  24 235   mirtazapine (Remeron) 45 mg tablet [id number] No Take 1 tablet (45 mg) by mouth once daily at bedtime. Santana Trinh MD Taking  10/21/24 235   multivitamin tablet [id number] No Take 1 tablet by mouth once daily. Historical Provider, MD Taking Active   oxyCODONE-acetaminophen (Percocet) 5-325 mg tablet [id number]  Take 1 tablet by mouth every 6 hours if needed for severe pain (7 - 10) for up to 3 days. Kelby Baker PA-C   24 235   QUEtiapine (SEROquel) 50 mg tablet [id number] No TAKE 1 TABLET (50 MG) BY MOUTH ONCE DAILY AT BEDTIME. Santana Trinh MD Taking  24 235                    No Known Allergies    Social History     Socioeconomic History    Marital status:      Spouse name: Not on file    Number of children: Not on file    Years of education: Not on file    Highest education level: Not on file   Occupational History    Not on file   Tobacco Use    Smoking status: Every Day     Current packs/day: 1.00     Average packs/day: 1 pack/day for 24.8 years (24.8 ttl pk-yrs)     Types: Cigarettes     Start date: 2000    Smokeless tobacco: Never   Vaping Use    Vaping status: Never Used   Substance and Sexual Activity    Alcohol use: Yes     Alcohol/week: 2.0 standard drinks of alcohol     Types: 2 Standard drinks or equivalent per week    Drug use: Never    Sexual activity: Yes     Partners: Male     Birth control/protection: None   Other Topics Concern    Not on file   Social History Narrative    Not on file     Social Drivers of Health     Financial Resource Strain: Not on file   Food Insecurity: Not on file   Transportation Needs: Not on file   Physical Activity: Not on file   Stress: Not on file   Social Connections: Not on file   Intimate Partner Violence: Not on file   Housing Stability: Not on file       Past Surgical History:   Procedure Laterality Date    ANTERIOR CRUCIATE LIGAMENT  REPAIR      HYSTERECTOMY  12/21/2023    OTHER SURGICAL HISTORY  11/29/2022    Knee surgery    OTHER SURGICAL HISTORY  11/29/2022    Excision melanoma       Dev Saeed MD    SURGICAL INDICATION    I reviewed the options for further management of this condition and the likely success rates of each.  The patient feels that they have maximized the benefits of conservative care, and they do want to go on to surgery.    I reviewed the major risks of surgery including infection; scarring; damage to nerves, tendons, or vessels; stiffness; nonunion, malunion, progressive arthritis, hardware issues, and wound healing problems, as well as anesthesia risks.  I answered their questions to their satisfaction.  They were given my contact information and will contact the office when they are ready to schedule an exact surgical date.  Surgery will be posted as follows :    Dx :          Displaced right capitellum fracture  ICD-10 :      s42.451a  Procedure :     ORIF right capitellum fracture  CPT :        87835  Anesth :    General Plus block  [facility] :   [facility]  Duration :    90 minutes  Specials :    [employer] headless screw set  PAT :       No  Post-Op Visit :    10-15 days        ATTESTATION    I saw and evaluated the patient. I personally obtained the key and critical portions of the history and physical exam or was physically present for key and critical portions performed by the resident/fellow. I reviewed the resident/fellow's documentation and discussed the patient with the resident/fellow. I agree with the resident/fellow's medical decision making as documented in the note.        Electronically signed  IGNACIO Laureano MD  [phone]

## 2024-11-05 NOTE — PROGRESS NOTES
CHIEF COMPLAINT         R elbow pain     ASSESSMENT + PLAN    Displaced right capitellum shear fracture    I reviewed the nature of the injury and the expected time course of healing.  I discussed my recommendation for surgical reduction and fixation of the fracture in order to unlock elbow motion.  I reviewed the major risks and benefits of the surgery.  She does want to proceed, and this will be set up for next week under a general anesthetic and a block, at the location of her convenience.  A new splint was applied today.    Contact my office in the meantime with any interval concerns.      HISTORY OF PRESENT ILLNESS       Patient is a 44 y.o. R-hand dominant female , who presents today for evaluation of R elbow fracture after fall running over curb on [address] holding a bag of candy in attempts to see a child in a [facility]. She was seen in the [facility] ED where imaging was obtained showing a displaced R capitellum fracture. She was placed in a posterior long-arm splint and instructed to follow up OP. She has pain today and has been taking Ibuprofen 800 with some relief. Swelling about the hand and soreness through whole arm. No numbness, tingling, or weakness in hand and fingers.  No previous difficulty with his elbow.    She is not diabetic or hypothyroid.  She does smoke.      REVIEW OF SYSTEMS       A 30-item multi-system Review Of Systems was obtained on today's intake form.  This was reviewed with the patient and is correct.  The pertinent positives and negatives are listed above.  The form has been scanned separately into the medical record.      PHYSICAL EXAM    Constitutional:    Appears stated age. Well-developed and well-nourished obese female in no acute distress  Psychiatric:         Pleasant normal mood and affect. Behavior is appropriate for the situation.   Head:                   Normocephalic and atraumatic.  Eyes:                    Pupils are equal and round.  Cardiovascular:  2+ radial  and ulnar pulses. Fingers well-perfused.  Respiratory:        Effort normal. No respiratory distress. Speaking in complete sentences.  Neurologic:       Alert and oriented to person, place, and time.  Skin:                Skin is intact, warm and dry.  Hematologic / Lymphatic:    No lymphedema or lymphangitis.    Extremities / Musculoskeletal:                      R upper extremity:  Splint removed.  - Skin intact, swelling in hand and fingers.  Mild lateral ecchymosis.  - Tender to palpation over R elbow laterally  - Limited elbow ROM at just 50 to 80 degrees.  Intact pronation and supination.  - Motor and sensation intact M/U/R distributions.  - Cap refill < 2 seconds in all digits.        IMAGING / LABS / EMGs           Xrays and CT scan of R elbow from [facility] were independently interpreted by me today and confirm a capitellum fracture displaced proximally and rotated 180 degrees.  No significant comminution or trochelar invovlement.  Joint is otherwise concentric and well-preserved.    Past Medical History:   Diagnosis Date    Anxiety     Depression     Fibroid     Hypertension     Melanoma of back (Multi) 12/29/2021    SCC (squamous cell carcinoma) 08/25/2023    SCC    Syncope 05/29/2018       Medication Documentation Review Audit       Reviewed by Rita Vieyra CMA (Medical Assistant) on 11/05/24 at 1419      Medication Order Taking? Sig Documenting Provider Last Dose Status   albuterol 2.5 mg /3 mL (0.083 %) nebulizer solution [id number] No Inhale 3 mL (2.5 mg). Historical Provider, MD Taking Active   atenolol (Tenormin) 50 mg tablet [id number] No Take by mouth. Historical Provider, MD Taking Active   ibuprofen 800 mg tablet [id number]  Take 1 tablet (800 mg) by mouth 3 times a day for 7 days. Kelby Baker PA-C  Active   irbesartan (Avapro) 300 mg tablet [id number]  Take 1 tablet (300 mg) by mouth once daily. Florian Reina MD  Active   LORazepam (Ativan) 0.5 mg tablet [id number] No Take 1 tablet (0.5 mg)  by mouth 2 times a day as needed for anxiety. Santana Trinh MD Taking  24 235   mirtazapine (Remeron) 45 mg tablet [id number] No Take 1 tablet (45 mg) by mouth once daily at bedtime. Santana Trinh MD Taking  10/21/24 235   multivitamin tablet [id number] No Take 1 tablet by mouth once daily. Historical Provider, MD Taking Active   oxyCODONE-acetaminophen (Percocet) 5-325 mg tablet [id number]  Take 1 tablet by mouth every 6 hours if needed for severe pain (7 - 10) for up to 3 days. Kelby Baker PA-C   24 235   QUEtiapine (SEROquel) 50 mg tablet [id number] No TAKE 1 TABLET (50 MG) BY MOUTH ONCE DAILY AT BEDTIME. Santana Trinh MD Taking  24 235                    No Known Allergies    Social History     Socioeconomic History    Marital status:      Spouse name: Not on file    Number of children: Not on file    Years of education: Not on file    Highest education level: Not on file   Occupational History    Not on file   Tobacco Use    Smoking status: Every Day     Current packs/day: 1.00     Average packs/day: 1 pack/day for 24.8 years (24.8 ttl pk-yrs)     Types: Cigarettes     Start date: 2000    Smokeless tobacco: Never   Vaping Use    Vaping status: Never Used   Substance and Sexual Activity    Alcohol use: Yes     Alcohol/week: 2.0 standard drinks of alcohol     Types: 2 Standard drinks or equivalent per week    Drug use: Never    Sexual activity: Yes     Partners: Male     Birth control/protection: None   Other Topics Concern    Not on file   Social History Narrative    Not on file     Social Drivers of Health     Financial Resource Strain: Not on file   Food Insecurity: Not on file   Transportation Needs: Not on file   Physical Activity: Not on file   Stress: Not on file   Social Connections: Not on file   Intimate Partner Violence: Not on file   Housing Stability: Not on file       Past Surgical History:   Procedure Laterality Date    ANTERIOR CRUCIATE LIGAMENT  REPAIR      HYSTERECTOMY  12/21/2023    OTHER SURGICAL HISTORY  11/29/2022    Knee surgery    OTHER SURGICAL HISTORY  11/29/2022    Excision melanoma       Dev Saeed MD    SURGICAL INDICATION    I reviewed the options for further management of this condition and the likely success rates of each.  The patient feels that they have maximized the benefits of conservative care, and they do want to go on to surgery.    I reviewed the major risks of surgery including infection; scarring; damage to nerves, tendons, or vessels; stiffness; nonunion, malunion, progressive arthritis, hardware issues, and wound healing problems, as well as anesthesia risks.  I answered their questions to their satisfaction.  They were given my contact information and will contact the office when they are ready to schedule an exact surgical date.  Surgery will be posted as follows :    Dx :          Displaced right capitellum fracture  ICD-10 :      s42.451a  Procedure :     ORIF right capitellum fracture  CPT :        72838  Anesth :    General Plus block  Location :   [facility]  Duration :    90 minutes  Specials :    [employer] headless screw set  PAT :       No  Post-Op Visit :    10-15 days        ATTESTATION    I saw and evaluated the patient. I personally obtained the key and critical portions of the history and physical exam or was physically present for key and critical portions performed by the resident/fellow. I reviewed the resident/fellow's documentation and discussed the patient with the resident/fellow. I agree with the resident/fellow's medical decision making as documented in the note.        Electronically signed  IGNACIO Laureano MD  [phone]

## 2024-11-06 PROBLEM — S42.451A: Status: ACTIVE | Noted: 2024-11-06

## 2024-11-06 PROBLEM — S42.451A: Status: ACTIVE | Noted: 2024-11-05

## 2024-11-09 RX ORDER — CEFAZOLIN SODIUM 2 G/50ML
2 SOLUTION INTRAVENOUS ONCE
OUTPATIENT
Start: 2024-11-09 | End: 2024-11-09

## 2024-11-11 ENCOUNTER — ANESTHESIA (OUTPATIENT)
Dept: OPERATING ROOM | Facility: CLINIC | Age: 44
End: 2024-11-11

## 2024-11-11 ENCOUNTER — HOSPITAL ENCOUNTER (OUTPATIENT)
Facility: CLINIC | Age: 44
Setting detail: OUTPATIENT SURGERY
Discharge: HOME | End: 2024-11-11
Attending: ORTHOPAEDIC SURGERY | Admitting: ORTHOPAEDIC SURGERY

## 2024-11-11 ENCOUNTER — ANESTHESIA EVENT (OUTPATIENT)
Dept: OPERATING ROOM | Facility: CLINIC | Age: 44
End: 2024-11-11

## 2024-11-11 VITALS
OXYGEN SATURATION: 97 % | HEART RATE: 62 BPM | HEIGHT: 60 IN | BODY MASS INDEX: 35.45 KG/M2 | RESPIRATION RATE: 18 BRPM | WEIGHT: 180.56 LBS | TEMPERATURE: 96.8 F | DIASTOLIC BLOOD PRESSURE: 84 MMHG | SYSTOLIC BLOOD PRESSURE: 172 MMHG

## 2024-11-11 DIAGNOSIS — S42.451A: Primary | ICD-10-CM

## 2024-11-11 DIAGNOSIS — G89.18 ACUTE POST-OPERATIVE PAIN: Primary | ICD-10-CM

## 2024-11-11 PROCEDURE — 2500000004 HC RX 250 GENERAL PHARMACY W/ HCPCS (ALT 636 FOR OP/ED): Performed by: ANESTHESIOLOGIST ASSISTANT

## 2024-11-11 PROCEDURE — 2500000001 HC RX 250 WO HCPCS SELF ADMINISTERED DRUGS (ALT 637 FOR MEDICARE OP): Performed by: ANESTHESIOLOGIST ASSISTANT

## 2024-11-11 RX ORDER — CELECOXIB 200 MG/1
CAPSULE ORAL AS NEEDED
Status: DISCONTINUED | OUTPATIENT
Start: 2024-11-11 | End: 2024-11-12 | Stop reason: HOSPADM

## 2024-11-11 RX ORDER — LABETALOL HYDROCHLORIDE 5 MG/ML
INJECTION, SOLUTION INTRAVENOUS AS NEEDED
Status: DISCONTINUED | OUTPATIENT
Start: 2024-11-11 | End: 2024-11-12 | Stop reason: HOSPADM

## 2024-11-11 RX ORDER — SCOLOPAMINE TRANSDERMAL SYSTEM 1 MG/1
PATCH, EXTENDED RELEASE TRANSDERMAL AS NEEDED
Status: DISCONTINUED | OUTPATIENT
Start: 2024-11-11 | End: 2024-11-12 | Stop reason: HOSPADM

## 2024-11-11 RX ORDER — ACETAMINOPHEN 325 MG/1
TABLET ORAL AS NEEDED
Status: DISCONTINUED | OUTPATIENT
Start: 2024-11-11 | End: 2024-11-12 | Stop reason: HOSPADM

## 2024-11-11 RX ORDER — MIDAZOLAM HYDROCHLORIDE 1 MG/ML
INJECTION, SOLUTION INTRAMUSCULAR; INTRAVENOUS AS NEEDED
Status: DISCONTINUED | OUTPATIENT
Start: 2024-11-11 | End: 2024-11-12 | Stop reason: HOSPADM

## 2024-11-11 RX ORDER — GABAPENTIN 300 MG/1
CAPSULE ORAL AS NEEDED
Status: DISCONTINUED | OUTPATIENT
Start: 2024-11-11 | End: 2024-11-12 | Stop reason: HOSPADM

## 2024-11-11 RX ORDER — HYDROCODONE BITARTRATE AND ACETAMINOPHEN 5; 325 MG/1; MG/1
1 TABLET ORAL EVERY 6 HOURS PRN
Qty: 28 TABLET | Refills: 0 | Status: SHIPPED | OUTPATIENT
Start: 2024-11-11 | End: 2024-11-24 | Stop reason: SDUPTHER

## 2024-11-11 ASSESSMENT — PAIN DESCRIPTION - DESCRIPTORS: DESCRIPTORS: ACHING;THROBBING

## 2024-11-11 ASSESSMENT — COLUMBIA-SUICIDE SEVERITY RATING SCALE - C-SSRS
1. IN THE PAST MONTH, HAVE YOU WISHED YOU WERE DEAD OR WISHED YOU COULD GO TO SLEEP AND NOT WAKE UP?: NO
6. HAVE YOU EVER DONE ANYTHING, STARTED TO DO ANYTHING, OR PREPARED TO DO ANYTHING TO END YOUR LIFE?: NO
2. HAVE YOU ACTUALLY HAD ANY THOUGHTS OF KILLING YOURSELF?: NO

## 2024-11-11 ASSESSMENT — PAIN - FUNCTIONAL ASSESSMENT: PAIN_FUNCTIONAL_ASSESSMENT: 0-10

## 2024-11-11 ASSESSMENT — PAIN SCALES - GENERAL: PAINLEVEL_OUTOF10: 6

## 2024-11-11 NOTE — LETTER
November 11, 2024     Patient: Melina Rivera   YOB: 1980   Date of Visit: 11/6/2024       To Whom It May Concern:    Melina Rivera was seen in my clinic on 11/6/2024 at . Due to her injury she should refrain from work involving any physical activity until further notice.       If you have any questions or concerns, please don't hesitate to call.         Sincerely,         Dev Saeed MD   Orthopaedic Surgery

## 2024-11-11 NOTE — ANESTHESIA PREPROCEDURE EVALUATION
Patient: Melina Rivera    Procedure Information       Date/Time: 11/11/24 1230    Procedure: ORIF Right Capitellum Fracture plus block (Right: Elbow) - PLUS BLOCK    [facility]    Surgeons: Lee Laureano MD            Relevant Problems   Cardiac   (+) HTN (hypertension)   (+) Primary hypertension      Neuro   (+) Anxiety   (+) MDD (major depressive disorder)   (+) Major depressive disorder      Endocrine   (+) Obesity      GYN   (+) Abnormal uterine bleeding       Clinical information reviewed:   Tobacco  Allergies  Meds   Med Hx  Surg Hx  OB Status  Fam Hx  Soc   Hx        NPO Detail:  NPO/Void Status  Date of Last Liquid: 11/11/24  Time of Last Liquid: 0715  Date of Last Solid: 11/10/24  Time of Last Solid: 2330  Last Intake Type: Clear fluids; Light meal         PHYSICAL EXAM    Anesthesia Plan

## 2024-11-11 NOTE — DISCHARGE INSTRUCTIONS
Follow-Up Instructions    You will need to be seen in clinic in 10-15 days for a post-operative evaluation.  This appointment will be in the outpatient office, not at the Surgery Center.    You will need to call Rita in my office and schedule an appointment, unless there is a previous appointment that appears on your discharge instructions.  Her phone number is [phone].  Please do not delay in calling to make this appointment.      Activity Restrictions    1)  No driving for 24 hours after surgery, due to the anesthetic.    2)  No driving or operating heavy machinery while taking narcotic pain medication.    3)  Weight bearing no more than 10 pounds in the splint.  Light use of the fingers (writing, typing, texting) is good to do.     Discharge Medications    A prescription for a narcotic pain medication (Norco) has been sent to your pharmacy of record.  I do not expect you will need this, but wanted you to have it available as an option if over-the-counter medications are not adequately controlling your pain.  Most people simply take Tylenol, Motrin, Advil, or other anti-inflammatory for the pain.  If you do end up taking the prescription medication, please try to wean yourself off this as quickly as possible.    You can add the prescription medication to the anti-inflammatories if needed, but should not add it to Tylenol, as there is already Tylenol in the prescription.    Wound care instructions:     1)  Leave operative dressing in place until you are seen in the office.  If you shower, cover the hand with a plastic bag and elevate it so the water cannot run down into the bag.    2)  Call if any drainage after 7 days, increased redness/warmth/swelling at incision site, abnormal pain/tenderness of the extremity, abnormal swelling of the extremity that does not respond to elevation, shortness of breath, or chest pain.

## 2024-11-12 ENCOUNTER — ANESTHESIA EVENT (OUTPATIENT)
Dept: OPERATING ROOM | Facility: HOSPITAL | Age: 44
End: 2024-11-12

## 2024-11-12 NOTE — ANESTHESIA PREPROCEDURE EVALUATION
Patient: Melina Rivera    Procedure Information       Date/Time: 11/13/24 1030    Procedure: Right Capitellum Fracture Open Reduction Internal Fixation (Right: Arm Upper) - Nerve block- single shot    [facility]    Surgeons: Will B Beucler, DO            Relevant Problems   Cardiac   (+) HTN (hypertension)   (+) Primary hypertension      Neuro   (+) Anxiety   (+) MDD (major depressive disorder)   (+) Major depressive disorder      Endocrine   (+) Obesity      GYN   (+) Abnormal uterine bleeding       Clinical information reviewed:                   NPO Detail:  NPO/Void Status  Date of Last Liquid: 11/11/24  Time of Last Liquid: 0715  Date of Last Solid: 11/10/24  Time of Last Solid: 2330  Last Intake Type: Clear fluids; Light meal         Physical Exam    Airway  Mallampati: II     Cardiovascular   Rhythm: regular  Rate: normal     Dental    Pulmonary    Abdominal            Anesthesia Plan    History of general anesthesia?: yes  History of complications of general anesthesia?: no    ASA 2     general     intravenous induction   Anesthetic plan and risks discussed with patient.    Plan discussed with CRNA.

## 2024-11-13 ENCOUNTER — APPOINTMENT (OUTPATIENT)
Dept: BEHAVIORAL HEALTH | Facility: HOSPITAL | Age: 44
End: 2024-11-13
Payer: COMMERCIAL

## 2024-11-13 ENCOUNTER — ANESTHESIA (OUTPATIENT)
Dept: OPERATING ROOM | Facility: HOSPITAL | Age: 44
End: 2024-11-13

## 2024-11-13 ENCOUNTER — HOSPITAL ENCOUNTER (OUTPATIENT)
Dept: RADIOLOGY | Facility: HOSPITAL | Age: 44
Setting detail: OUTPATIENT SURGERY
Discharge: HOME | End: 2024-11-13

## 2024-11-13 ENCOUNTER — HOSPITAL ENCOUNTER (OUTPATIENT)
Facility: HOSPITAL | Age: 44
Setting detail: OUTPATIENT SURGERY
Discharge: HOME | End: 2024-11-13
Attending: STUDENT IN AN ORGANIZED HEALTH CARE EDUCATION/TRAINING PROGRAM | Admitting: STUDENT IN AN ORGANIZED HEALTH CARE EDUCATION/TRAINING PROGRAM

## 2024-11-13 VITALS
SYSTOLIC BLOOD PRESSURE: 150 MMHG | TEMPERATURE: 97.3 F | HEIGHT: 60 IN | RESPIRATION RATE: 19 BRPM | WEIGHT: 177.69 LBS | OXYGEN SATURATION: 95 % | HEART RATE: 79 BPM | DIASTOLIC BLOOD PRESSURE: 86 MMHG | BODY MASS INDEX: 34.89 KG/M2

## 2024-11-13 DIAGNOSIS — S59.901A ELBOW INJURY, RIGHT, INITIAL ENCOUNTER: ICD-10-CM

## 2024-11-13 PROCEDURE — 2500000004 HC RX 250 GENERAL PHARMACY W/ HCPCS (ALT 636 FOR OP/ED): Performed by: NURSE ANESTHETIST, CERTIFIED REGISTERED

## 2024-11-13 PROCEDURE — 2500000005 HC RX 250 GENERAL PHARMACY W/O HCPCS: Performed by: STUDENT IN AN ORGANIZED HEALTH CARE EDUCATION/TRAINING PROGRAM

## 2024-11-13 PROCEDURE — A24579 PR OPEN TX HUMERAL CONDYLAR FRACTURE: Performed by: NURSE ANESTHETIST, CERTIFIED REGISTERED

## 2024-11-13 PROCEDURE — A4649 SURGICAL SUPPLIES: HCPCS | Performed by: STUDENT IN AN ORGANIZED HEALTH CARE EDUCATION/TRAINING PROGRAM

## 2024-11-13 PROCEDURE — 2720000007 HC OR 272 NO HCPCS: Performed by: STUDENT IN AN ORGANIZED HEALTH CARE EDUCATION/TRAINING PROGRAM

## 2024-11-13 PROCEDURE — 3700000002 HC GENERAL ANESTHESIA TIME - EACH INCREMENTAL 1 MINUTE: Performed by: STUDENT IN AN ORGANIZED HEALTH CARE EDUCATION/TRAINING PROGRAM

## 2024-11-13 PROCEDURE — 7100000009 HC PHASE TWO TIME - INITIAL BASE CHARGE: Performed by: STUDENT IN AN ORGANIZED HEALTH CARE EDUCATION/TRAINING PROGRAM

## 2024-11-13 PROCEDURE — 76000 FLUOROSCOPY <1 HR PHYS/QHP: CPT | Mod: RT

## 2024-11-13 PROCEDURE — 64415 NJX AA&/STRD BRCH PLXS IMG: CPT | Performed by: ANESTHESIOLOGY

## 2024-11-13 PROCEDURE — P9045 ALBUMIN (HUMAN), 5%, 250 ML: HCPCS | Mod: JZ | Performed by: NURSE ANESTHETIST, CERTIFIED REGISTERED

## 2024-11-13 PROCEDURE — 7100000001 HC RECOVERY ROOM TIME - INITIAL BASE CHARGE: Performed by: STUDENT IN AN ORGANIZED HEALTH CARE EDUCATION/TRAINING PROGRAM

## 2024-11-13 PROCEDURE — A24579 PR OPEN TX HUMERAL CONDYLAR FRACTURE: Performed by: ANESTHESIOLOGY

## 2024-11-13 PROCEDURE — 3700000001 HC GENERAL ANESTHESIA TIME - INITIAL BASE CHARGE: Performed by: STUDENT IN AN ORGANIZED HEALTH CARE EDUCATION/TRAINING PROGRAM

## 2024-11-13 PROCEDURE — 7100000002 HC RECOVERY ROOM TIME - EACH INCREMENTAL 1 MINUTE: Performed by: STUDENT IN AN ORGANIZED HEALTH CARE EDUCATION/TRAINING PROGRAM

## 2024-11-13 PROCEDURE — 3600000009 HC OR TIME - EACH INCREMENTAL 1 MINUTE - PROCEDURE LEVEL FOUR: Performed by: STUDENT IN AN ORGANIZED HEALTH CARE EDUCATION/TRAINING PROGRAM

## 2024-11-13 PROCEDURE — 7100000010 HC PHASE TWO TIME - EACH INCREMENTAL 1 MINUTE: Performed by: STUDENT IN AN ORGANIZED HEALTH CARE EDUCATION/TRAINING PROGRAM

## 2024-11-13 PROCEDURE — C1713 ANCHOR/SCREW BN/BN,TIS/BN: HCPCS | Performed by: STUDENT IN AN ORGANIZED HEALTH CARE EDUCATION/TRAINING PROGRAM

## 2024-11-13 PROCEDURE — 3600000004 HC OR TIME - INITIAL BASE CHARGE - PROCEDURE LEVEL FOUR: Performed by: STUDENT IN AN ORGANIZED HEALTH CARE EDUCATION/TRAINING PROGRAM

## 2024-11-13 PROCEDURE — 2780000003 HC OR 278 NO HCPCS: Performed by: STUDENT IN AN ORGANIZED HEALTH CARE EDUCATION/TRAINING PROGRAM

## 2024-11-13 DEVICE — IMPLANTABLE DEVICE: Type: IMPLANTABLE DEVICE | Site: ARM | Status: FUNCTIONAL

## 2024-11-13 RX ORDER — SODIUM CHLORIDE 0.9 G/100ML
IRRIGANT IRRIGATION AS NEEDED
Status: DISCONTINUED | OUTPATIENT
Start: 2024-11-13 | End: 2024-11-13 | Stop reason: HOSPADM

## 2024-11-13 RX ORDER — ONDANSETRON HYDROCHLORIDE 2 MG/ML
4 INJECTION, SOLUTION INTRAVENOUS ONCE AS NEEDED
Status: DISCONTINUED | OUTPATIENT
Start: 2024-11-13 | End: 2024-11-13 | Stop reason: HOSPADM

## 2024-11-13 RX ORDER — IPRATROPIUM BROMIDE 0.5 MG/2.5ML
500 SOLUTION RESPIRATORY (INHALATION) ONCE
Status: DISCONTINUED | OUTPATIENT
Start: 2024-11-13 | End: 2024-11-13 | Stop reason: HOSPADM

## 2024-11-13 RX ORDER — OXYCODONE HYDROCHLORIDE 5 MG/1
5 TABLET ORAL EVERY 4 HOURS PRN
Status: DISCONTINUED | OUTPATIENT
Start: 2024-11-13 | End: 2024-11-13 | Stop reason: HOSPADM

## 2024-11-13 RX ORDER — SODIUM CHLORIDE, SODIUM LACTATE, POTASSIUM CHLORIDE, CALCIUM CHLORIDE 600; 310; 30; 20 MG/100ML; MG/100ML; MG/100ML; MG/100ML
100 INJECTION, SOLUTION INTRAVENOUS CONTINUOUS
Status: DISCONTINUED | OUTPATIENT
Start: 2024-11-13 | End: 2024-11-13 | Stop reason: HOSPADM

## 2024-11-13 RX ORDER — HYDROMORPHONE HYDROCHLORIDE 1 MG/ML
0.5 INJECTION, SOLUTION INTRAMUSCULAR; INTRAVENOUS; SUBCUTANEOUS EVERY 5 MIN PRN
Status: DISCONTINUED | OUTPATIENT
Start: 2024-11-13 | End: 2024-11-13 | Stop reason: HOSPADM

## 2024-11-13 RX ORDER — MIDAZOLAM HYDROCHLORIDE 1 MG/ML
INJECTION, SOLUTION INTRAMUSCULAR; INTRAVENOUS AS NEEDED
Status: DISCONTINUED | OUTPATIENT
Start: 2024-11-13 | End: 2024-11-13

## 2024-11-13 RX ORDER — LIDOCAINE HYDROCHLORIDE 20 MG/ML
INJECTION, SOLUTION EPIDURAL; INFILTRATION; INTRACAUDAL; PERINEURAL AS NEEDED
Status: DISCONTINUED | OUTPATIENT
Start: 2024-11-13 | End: 2024-11-13

## 2024-11-13 RX ORDER — FENTANYL CITRATE 50 UG/ML
INJECTION, SOLUTION INTRAMUSCULAR; INTRAVENOUS AS NEEDED
Status: DISCONTINUED | OUTPATIENT
Start: 2024-11-13 | End: 2024-11-13

## 2024-11-13 RX ORDER — PHENYLEPHRINE HCL IN 0.9% NACL 1 MG/10 ML
SYRINGE (ML) INTRAVENOUS AS NEEDED
Status: DISCONTINUED | OUTPATIENT
Start: 2024-11-13 | End: 2024-11-13

## 2024-11-13 RX ORDER — MIDAZOLAM HYDROCHLORIDE 5 MG/ML
INJECTION INTRAMUSCULAR; INTRAVENOUS AS NEEDED
Status: DISCONTINUED | OUTPATIENT
Start: 2024-11-13 | End: 2024-11-13

## 2024-11-13 RX ORDER — SODIUM CHLORIDE, SODIUM LACTATE, POTASSIUM CHLORIDE, CALCIUM CHLORIDE 600; 310; 30; 20 MG/100ML; MG/100ML; MG/100ML; MG/100ML
INJECTION, SOLUTION INTRAVENOUS CONTINUOUS PRN
Status: DISCONTINUED | OUTPATIENT
Start: 2024-11-13 | End: 2024-11-13

## 2024-11-13 RX ORDER — ONDANSETRON HYDROCHLORIDE 2 MG/ML
INJECTION, SOLUTION INTRAVENOUS AS NEEDED
Status: DISCONTINUED | OUTPATIENT
Start: 2024-11-13 | End: 2024-11-13

## 2024-11-13 RX ORDER — PROPOFOL 10 MG/ML
INJECTION, EMULSION INTRAVENOUS AS NEEDED
Status: DISCONTINUED | OUTPATIENT
Start: 2024-11-13 | End: 2024-11-13

## 2024-11-13 RX ORDER — HYDROMORPHONE HYDROCHLORIDE 1 MG/ML
0.25 INJECTION, SOLUTION INTRAMUSCULAR; INTRAVENOUS; SUBCUTANEOUS EVERY 5 MIN PRN
Status: DISCONTINUED | OUTPATIENT
Start: 2024-11-13 | End: 2024-11-13 | Stop reason: HOSPADM

## 2024-11-13 RX ORDER — ALBUMIN HUMAN 50 G/1000ML
SOLUTION INTRAVENOUS AS NEEDED
Status: DISCONTINUED | OUTPATIENT
Start: 2024-11-13 | End: 2024-11-13

## 2024-11-13 RX ORDER — ALBUTEROL SULFATE 0.83 MG/ML
2.5 SOLUTION RESPIRATORY (INHALATION) ONCE AS NEEDED
Status: DISCONTINUED | OUTPATIENT
Start: 2024-11-13 | End: 2024-11-13 | Stop reason: HOSPADM

## 2024-11-13 RX ORDER — ACETAMINOPHEN 325 MG/1
650 TABLET ORAL EVERY 4 HOURS PRN
Status: DISCONTINUED | OUTPATIENT
Start: 2024-11-13 | End: 2024-11-13 | Stop reason: HOSPADM

## 2024-11-13 RX ORDER — LIDOCAINE HYDROCHLORIDE 10 MG/ML
0.1 INJECTION, SOLUTION EPIDURAL; INFILTRATION; INTRACAUDAL; PERINEURAL ONCE
Status: DISCONTINUED | OUTPATIENT
Start: 2024-11-13 | End: 2024-11-13 | Stop reason: HOSPADM

## 2024-11-13 RX ORDER — DROPERIDOL 2.5 MG/ML
0.62 INJECTION, SOLUTION INTRAMUSCULAR; INTRAVENOUS ONCE AS NEEDED
Status: DISCONTINUED | OUTPATIENT
Start: 2024-11-13 | End: 2024-11-13 | Stop reason: HOSPADM

## 2024-11-13 RX ORDER — CEFAZOLIN 1 G/1
INJECTION, POWDER, FOR SOLUTION INTRAVENOUS AS NEEDED
Status: DISCONTINUED | OUTPATIENT
Start: 2024-11-13 | End: 2024-11-13

## 2024-11-13 ASSESSMENT — PAIN - FUNCTIONAL ASSESSMENT
PAIN_FUNCTIONAL_ASSESSMENT: 0-10

## 2024-11-13 ASSESSMENT — PAIN SCALES - GENERAL
PAINLEVEL_OUTOF10: 0 - NO PAIN
PAINLEVEL_OUTOF10: 0 - NO PAIN
PAIN_LEVEL: 0
PAINLEVEL_OUTOF10: 0 - NO PAIN
PAINLEVEL_OUTOF10: 6
PAINLEVEL_OUTOF10: 0 - NO PAIN
PAINLEVEL_OUTOF10: 0 - NO PAIN

## 2024-11-13 NOTE — H&P
Bed: P8  Expected date:   Expected time:   Means of arrival:   Comments:  G21   [facility] Department of Orthopaedic Surgery   Surgical History & Physical <30 Days    Reason for Surgery: R capitellum fracture   Planned Procedure: ORIF R capitellum     History & Physical Reviewed:  I have reviewed the History and Physical for obtained within the last 30 days. Relevant findings and updates are noted below:  No significant changes.    Home medications were reviewed with significant updates noted below:  No significant changes.    ERAS patient?: No    COVID-19 Risk Consent:   Surgeon has reviewed the key risks related to beltran COVID-19 and subsequent sequelae.     11/13/24 at 9:12 AM - Dev Saeed MD

## 2024-11-13 NOTE — H&P
History of Present Illness   Patient presents today for evaluation of side: right upper extremity pain.    The patient sustained an acute injury on  10/31/2024 .  The patient tripped and fell onto her right upper extremity.  She had immediate pain.  She went to [facility]note 3] had x-rays and CT performed which demonstrated a displaced capitellum fracture.  She was subsequently seen and evaluated by my partner Dr. Laureano.  He had her scheduled for surgery on 11/11/2024 however she had elevated blood pressure and this was being performed at the surgery center.  Based on her high blood pressure her surgery was canceled.  Dr. Laureano reach out to me to see if I had availability at a hospital to do her surgery.  She was scheduled for ORIF of the right capitellum with me on 11/13/2024.  The patient denies any loss of consciousness or additional significant injuries.  The patient denies any current numbness or tingling.  The pain is sharp, acute in nature, better with rest worse with motion.     Past Medical History:   Diagnosis Date    Anxiety     Depression     Fibroid     Hypertension     Melanoma of back (Multi) 12/29/2021    SCC (squamous cell carcinoma) 08/25/2023    SCC       Medication Documentation Review Audit       Reviewed by Rosemary Adame LPN (Licensed Nurse) on 11/13/24 at 0938      Medication Order Taking? Sig Documenting Provider Last Dose Status   HYDROcodone-acetaminophen (Norco) 5-325 mg tablet [id number] Yes Take 1 tablet by mouth every 6 hours if needed for severe pain (7 - 10) for up to 7 days. Dev Saeed MD Past Week Active   ibuprofen 800 mg tablet [id number] Yes Take 1 tablet (800 mg) by mouth 3 times a day for 7 days. Kelby Baker PA-C 11/12/2024 Active   irbesartan (Avapro) 300 mg tablet [id number] Yes Take 1 tablet (300 mg) by mouth once daily. Florian Reina MD 11/13/2024 Morning Active   mirtazapine (Remeron) 45 mg tablet [id number] Yes TAKE 1 TABLET (45 MG) BY MOUTH ONCE DAILY AT  BEDTIME. Santana Trinh MD 11/12/2024 Active   multivitamin tablet [id number] Yes Take 1 tablet by mouth once daily. Historical Provider, MD Past Week Active                    No Known Allergies    Social History     Socioeconomic History    Marital status:      Spouse name: Not on file    Number of children: Not on file    Years of education: Not on file    Highest education level: Not on file   Occupational History    Not on file   Tobacco Use    Smoking status: Every Day     Current packs/day: 1.00     Average packs/day: 1 pack/day for 24.9 years (24.9 ttl pk-yrs)     Types: Cigarettes     Start date: 1/1/2000    Smokeless tobacco: Never   Vaping Use    Vaping status: Never Used   Substance and Sexual Activity    Alcohol use: Yes     Alcohol/week: 2.0 standard drinks of alcohol     Types: 2 Standard drinks or equivalent per week    Drug use: Never    Sexual activity: Yes     Partners: Male     Birth control/protection: None   Other Topics Concern    Not on file   Social History Narrative    Not on file     Social Drivers of Health     Financial Resource Strain: Not on file   Food Insecurity: Not on file   Transportation Needs: Not on file   Physical Activity: Not on file   Stress: Not on file   Social Connections: Not on file   Intimate Partner Violence: Not on file   Housing Stability: Not on file       Past Surgical History:   Procedure Laterality Date    ANTERIOR CRUCIATE LIGAMENT REPAIR      HYSTERECTOMY  12/21/2023    OTHER SURGICAL HISTORY  11/29/2022    Knee surgery    OTHER SURGICAL HISTORY  11/29/2022    Excision melanoma          Review of Systems   GENERAL: Negative  GI: Negative  MUSCULOSKELETAL: See HPI  SKIN: Negative  NEURO:  Negative     Physical Exam:  side: right upper extremity:  Well-padded posterior long-arm splint was on.  Mild swelling seen to the entire upper extremity.  Range of motion was not tested.  Intact flexion and extension of 1st IP joint and finger abduction  Sensation intact  to light touch medial / ulnar and radial nerve distribution   Good cap refill     Imaging  XR of the right elbow demonstrate 100% displaced capitellar fracture.  This is a type I capitellum fracture.    CT demonstrated the same findings.     Assessment   Patient with an acute side: right capitellum fracture      Plan:  The indications for surgical versus nonsurgical management of the condition have been advised, including the inherent risks, benefits, prognosis of the condition.  The risks of surgery include, but are not limited to, pain, bleeding, blood clots, infection, tendon damage, nerve damage, vessel damage, and need for further surgery.  The risks also include wound healing problems, decreased long-term functionality of the elbow, wrist and hand, tendon irritation, tendon rupture, and the need for therapy for an optimum outcome.  There is a risk of complex regional pain syndrome, incomplete recovery, incomplete relief or worsening of symptoms, and recurrence.     We also discussed the inherent risks of this operation which include elbow stiffness, nonunion, infection, arthritis, cubitus varus, AVN of the fracture fragment.    We also discussed that medical complications are possible during and after surgery related to either anesthesia or the surgical procedure itself. Specific discussion of the risks of the proposed anesthetic plan will be discussed by the patient's anesthesia provider. All risks were reviewed in layman´s terms. The general plan for the postoperative rehabilitation course, including possible need for therapy, was reviewed at great length. The patient was given ample time to ask appropriate questions and appeared to be satisfied with the answers provided. All questions were answered and no guarantees have been given regarding the patient's condition and treatment recommendations.    Okay to proceed with surgery     Follow-up  postop

## 2024-11-13 NOTE — BRIEF OP NOTE
Date: 2024  OR Location: [facility] OR    Name: Melina Rivera, : 1980, Age: 44 y.o., MRN: [id number], Sex: female    Diagnosis  Pre-op Diagnosis      * Fracture of elbow, lateral condyle, right, closed, initial encounter [S42.238A] Post-op Diagnosis     * Fracture of elbow, lateral condyle, right, closed, initial encounter [S42.558A]     Procedures  Right Capitellum Fracture Open Reduction Internal Fixation  [address] - [place] OPEN TREATMENT HUMERAL CONDYLAR FRACTURE      Surgeons      * Will B Beucler - Primary    Resident/Fellow/Other Assistant:  Surgeons and Role:     * Dev Saeed MD - Resident - Assisting    Staff:   Circulator: Dottie Ruggiero Person: Isai Storm Scrub: Flaquita Storm Circulator: Alisia Storm Circulator: Alisia    Anesthesia Staff: Anesthesiologist: Jay Elise MD; Oral Lou MD  CRNA: ELOY Hernandez-CRNA; ELOY Fletcher-CRNA    Procedure Summary  Anesthesia: General  ASA: II  Estimated Blood Loss: 20 mls   Intra-op Medications:   Administrations occurring from 1030 to 1300 on 24:   Medication Name Total Dose   albumin human 5 % 250 mL   ceFAZolin (Ancef) vial 1 g 2 g   dexAMETHasone (Decadron) injection 4 mg/mL 4 mg   fentaNYL (Sublimaze) injection 50 mcg/mL 50 mcg   glycopyrrolate PF (Robinul) injection 0.4 mg   lactated Ringer's infusion Cannot be calculated   lidocaine PF (Xylocaine-MPF) local injection 2 % 100 mg   ondansetron (Zofran) 2 mg/mL injection 4 mg   phenylephrine 100 mcg/mL syringe 10 mL (prefilled) 900 mcg   propofol (Diprivan) injection 10 mg/mL 150 mg              Anesthesia Record               Intraprocedure I/O Totals          Intake    lactated Ringer's 350.00 mL    Total Intake 350 mL          Specimen: No specimens collected       Complications:  None; patient tolerated the procedure well.     Disposition: PACU - hemodynamically stable.  Condition: stable  Specimens Collected: No specimens collected  Attending Attestation:      Barry Gtz  Phone Number: [phone]

## 2024-11-13 NOTE — PERIOPERATIVE NURSING NOTE
1440: Phase 1 care begins  1449: Pt family updated via message  1515: Phase 2 care begins

## 2024-11-13 NOTE — ANESTHESIA POSTPROCEDURE EVALUATION
Patient: Melina Rivera    Procedure Summary       Date: 11/13/24 Room / Location: [facility] / [facility]    Anesthesia Start: 1058 Anesthesia Stop: 1442    Procedure: Right Capitellum Fracture Open Reduction Internal Fixation (Right: Arm Upper) Diagnosis:       Fracture of elbow, lateral condyle, right, closed, initial encounter      (Fracture of elbow, lateral condyle, right, closed, initial encounter [S42.451A])    Surgeons: Barry SNOW BeuclerDO Responsible Provider: Oral Lou MD    Anesthesia Type: general ASA Status: 2            Anesthesia Type: general    Vitals Value Taken Time   /83 11/13/24 1500   Temp 36.4 °C (97.5 °F) 11/13/24 1440   Pulse 89 11/13/24 1500   Resp 18 11/13/24 1500   SpO2 94 % 11/13/24 1500       Anesthesia Post Evaluation    Patient location during evaluation: bedside  Patient participation: complete - patient cannot participate  Level of consciousness: awake  Pain score: 0  Pain management: adequate  Airway patency: patent  Cardiovascular status: acceptable and hemodynamically stable  Respiratory status: acceptable and nonlabored ventilation  Hydration status: acceptable  Postoperative Nausea and Vomiting: none        No notable events documented.

## 2024-11-13 NOTE — ANESTHESIA PROCEDURE NOTES
Peripheral Block    Patient location during procedure: pre-op  Reason for block: procedure for pain, at surgeon's request and post-op pain management  Staffing  Performed: attending   Authorized by: Jay Elise MD    Performed by: Jay Elise MD  Preanesthetic Checklist  Completed: patient identified, IV checked, site marked, risks and benefits discussed, surgical consent, monitors and equipment checked, pre-op evaluation and timeout performed   Timeout performed at:   Peripheral Block  Prep: alcohol swabs  Patient monitoring: continuous pulse ox  Block type: supraclavicular  Laterality: right  Injection technique: single-shot  Guidance: ultrasound guided  Local infiltration: lidocaine  Needle  Needle type: short-bevel   Needle gauge: 22 G  Needle length: 5 cm  Needle localization: ultrasound guidance     image stored in chart  Test dose: negative  Assessment  Injection assessment: negative aspiration for heme, no paresthesia on injection, incremental injection and local visualized surrounding nerve on ultrasound  Heart rate change: no  Slow fractionated injection: yes  Additional Notes  40 ml 0.375% bupivacaine with 1:200K epi, 20 mcg precedex and 4 mg dexamethasone injected

## 2024-11-13 NOTE — DISCHARGE INSTRUCTIONS
Post-Operative Instructions   Dr. Barry Langucler   [phone]     Dressing: You have a bandage or splint covering your operative site:    __Do not remove the dressing until your next scheduled appointment with your surgeon or therapist. Keep your dressing clean and dry. The dressing will be removed at that appointment     Showering: You may shower following surgery but please adhere to above instructions regarding the dressing. If showering with bandage / splint in place please ensure that it is kept dry and covered while bathing. There are commercially available cast bags that can be used to protect your dressing while showering. If using garbage bags please make sure that there are no holes in the bag and that the bag has been sealed above the dressing. If the bandage gets wet you must contact your surgeon's office to make arrangements to be seen to have bandage changed.    Ice / Elevation: The application of ice to your surgical site after surgery will help with pain control and swelling. This can be very effective for 48-72 hours after surgery. Please be careful to avoid getting bandage wet from a leaky ice bag. Please be advised that the ice may need to be applied for longer periods of time for the cooling effect to penetrate the post-operative dressing. Elevation of the operative site above the level of the heart as much as possible for the first 48-72 hours after surgery. Use your sling if you have been provided one while standing or walking. If your fingers are not included in the dressing you are encouraged to attempt finger range of motion as this will help with your hand swelling and ultimate recovery.     Pain Medication: If you received a regional anesthetic on the day of your surgery your arm and hand may be numb for up to 24 hours after surgery. It is important to wear your sling while the block is still effective in order to protect your arm. It is advisable to take pain medications prior to going  to sleep in case the regional anesthesia medication wears off while you are sleeping. Take your pain medications as directed. Narcotic pain medications can cause lethargy, nausea and constipation. Please contact your surgeon's office and discontinue the medication if these symptoms  become problematic. Eating a regular diet, drinking fluids and walking can  help with constipation from these medications.   Avoid alcohol consumption     Additional pain control options:    ___ You are encouraged to take over the counter medications like Advil / Motrin / Ibuprofen / Aleve in addition to your prescribed pain medications after surgery     Smoking / Tobacco: Tobacco use is known to interfere with wound and fracture healing and increase post-operative pain. It can also increase your risk of poor outcomes following surgery. Please do not use tobacco or nicotine products following your surgery. This includes smokeless tobacco, or nicotine replacement products (patches, gum, etc.)     Driving: It is not advisable to operate a vehicle while using narcotic pain medications. Additionally, please be advised that you are likely to have challenges operating a car or motorcycle while you are still in your postoperative dressing and this could increase your risk of being involved in an accident and being cited for driving while physically impaired.     Warning Signs: Observe your arm / hand and incision site (if visible) for increased redness, inflammation, drainage, odor or pain that is unrelieved by rest, elevation or medication. Please contact your surgeon's office immediately if you develop any of these issues or if you develop a fever greater than 101°.     Follow Up Appointments: Your post-operative appointment has been scheduled for      ____You do not yet have a post-operative appointment scheduled. Please contact Dr. Gtz's office at [phone] to schedule this appointment.

## 2024-11-13 NOTE — ANESTHESIA PROCEDURE NOTES
Airway  Date/Time: 11/13/2024 11:09 AM  Urgency: elective    Airway not difficult    Staffing  Performed: CRNA   Authorized by: Jay Elise MD    Performed by: ELOY Hernandez-KLAUDIA  Patient location during procedure: OR    Indications and Patient Condition  Indications for airway management: anesthesia  Spontaneous Ventilation: absent  Sedation level: deep  Preoxygenated: yes  Patient position: sniffing  Mask difficulty assessment: 0 - not attempted    Final Airway Details  Final airway type: supraglottic airway      Successful airway: oropharyngeal (igel)  Size 4     Number of attempts at approach: 1

## 2024-11-15 ASSESSMENT — PAIN SCALES - GENERAL: PAINLEVEL_OUTOF10: 8

## 2024-11-22 ENCOUNTER — HOSPITAL ENCOUNTER (OUTPATIENT)
Dept: RADIOLOGY | Facility: CLINIC | Age: 44
Discharge: HOME | End: 2024-11-22

## 2024-11-22 ENCOUNTER — APPOINTMENT (OUTPATIENT)
Dept: ORTHOPEDIC SURGERY | Facility: CLINIC | Age: 44
End: 2024-11-22

## 2024-11-22 DIAGNOSIS — G89.18 ACUTE POST-OPERATIVE PAIN: ICD-10-CM

## 2024-11-22 DIAGNOSIS — S42.451A CLOSED FRACTURE OF CAPITULUM OF RIGHT HUMERUS, INITIAL ENCOUNTER: Primary | ICD-10-CM

## 2024-11-22 DIAGNOSIS — S42.451A CLOSED FRACTURE OF CAPITULUM OF RIGHT HUMERUS, INITIAL ENCOUNTER: ICD-10-CM

## 2024-11-22 PROCEDURE — 99024 POSTOP FOLLOW-UP VISIT: CPT | Performed by: STUDENT IN AN ORGANIZED HEALTH CARE EDUCATION/TRAINING PROGRAM

## 2024-11-22 PROCEDURE — 73080 X-RAY EXAM OF ELBOW: CPT | Mod: RT

## 2024-11-22 ASSESSMENT — PAIN - FUNCTIONAL ASSESSMENT: PAIN_FUNCTIONAL_ASSESSMENT: 0-10

## 2024-11-22 ASSESSMENT — PAIN SCALES - GENERAL: PAINLEVEL_OUTOF10: 5 - MODERATE PAIN

## 2024-11-25 RX ORDER — HYDROCODONE BITARTRATE AND ACETAMINOPHEN 5; 325 MG/1; MG/1
1 TABLET ORAL EVERY 6 HOURS PRN
Qty: 28 TABLET | Refills: 0 | Status: SHIPPED | OUTPATIENT
Start: 2024-11-25 | End: 2024-12-02

## 2024-12-03 ENCOUNTER — OFFICE VISIT (OUTPATIENT)
Dept: ORTHOPEDIC SURGERY | Facility: HOSPITAL | Age: 44
End: 2024-12-03
Payer: COMMERCIAL

## 2024-12-03 DIAGNOSIS — S42.451A CLOSED FRACTURE OF CAPITULUM OF RIGHT HUMERUS, INITIAL ENCOUNTER: Primary | ICD-10-CM

## 2024-12-03 PROCEDURE — 4004F PT TOBACCO SCREEN RCVD TLK: CPT | Performed by: STUDENT IN AN ORGANIZED HEALTH CARE EDUCATION/TRAINING PROGRAM

## 2024-12-03 PROCEDURE — 99211 OFF/OP EST MAY X REQ PHY/QHP: CPT | Performed by: STUDENT IN AN ORGANIZED HEALTH CARE EDUCATION/TRAINING PROGRAM

## 2024-12-03 ASSESSMENT — PAIN - FUNCTIONAL ASSESSMENT: PAIN_FUNCTIONAL_ASSESSMENT: 0-10

## 2024-12-03 ASSESSMENT — PAIN SCALES - GENERAL: PAINLEVEL_OUTOF10: 3

## 2024-12-03 NOTE — OP NOTE
Right Capitellum Fracture Open Reduction Internal Fixation (R) Operative Note     Date: 2024  OR Location: [facility] A OR    Name: Melina Rivera, : 1980, Age: 44 y.o., MRN: [id number], Sex: female    Diagnosis  Pre-op Diagnosis      * Fracture of elbow, lateral condyle, right, closed, initial encounter [S42.789A] Post-op Diagnosis     * Fracture of elbow, lateral condyle, right, closed, initial encounter [S42.451A]     Procedures  Right Capitellum Fracture Open Reduction Internal Fixation  31022 - OR OPEN TREATMENT HUMERAL CONDYLAR FRACTURE      Surgeons      * Will B Beucler - Primary    Resident/Fellow/Other Assistant:  Surgeons and Role:     * Dev Saeed MD - Resident - Assisting    Staff:   Circulator: Dottie Ruggiero Person: Isai Storm Scrub: Flaquita Storm Circulator: Alisia Storm Circulator: Alisia    Anesthesia Staff: Anesthesiologist: Jay Elise MD; Oral Lou MD  CRNA: ELOY Hernandez-CRNA; FRANCISCA FletcherCRNA    Procedure Summary  Anesthesia: General  ASA: II  Estimated Blood Loss: 5 mL  Intra-op Medications:   Administrations occurring from 1030 to 1300 on 24:   Medication Name Total Dose   albumin human 5 % 250 mL   ceFAZolin (Ancef) vial 1 g 2 g   dexAMETHasone (Decadron) injection 4 mg/mL 4 mg   fentaNYL (Sublimaze) injection 50 mcg/mL 50 mcg   glycopyrrolate PF (Robinul) injection 0.4 mg   lactated Ringer's infusion Cannot be calculated   lidocaine PF (Xylocaine-MPF) local injection 2 % 100 mg   ondansetron (Zofran) 2 mg/mL injection 4 mg   phenylephrine 100 mcg/mL syringe 10 mL (prefilled) 900 mcg   propofol (Diprivan) injection 10 mg/mL 150 mg              Anesthesia Record               Intraprocedure I/O Totals          Intake    lactated Ringer's 350.00 mL    Total Intake 350 mL          Specimen: No specimens collected              Drains and/or Catheters: * None in log *    Tourniquet Times:     Total Tourniquet Time Documented:  area (Right)  - 130 minutes  Total: area (Right) - 130 minutes      Implants:  Implants       Type Name Action Serial No.      Screw 20MM , 2.5 MICRO ACUTRACK 3 BONE SCREW Implanted N/A      22 MM , 2.5 MICRO ACUTRACK 3 BONE SCREW Implanted N/A              Findings: Capitellar shear fracture    Indications: Melina Rivera is an 44 y.o. female who is having surgery for Fracture of elbow, lateral condyle, right, closed, initial encounter [S42.982A].  Patient sustained a capitellar shear fracture with 100% displacement.    The patient was seen in the preoperative area. The risks, benefits, complications, treatment options, non-operative alternatives, expected recovery and outcomes were discussed with the patient. The possibilities of reaction to medication, pulmonary aspiration, injury to surrounding structures, bleeding, recurrent infection, the need for additional procedures, failure to diagnose a condition, and creating a complication requiring transfusion or operation were discussed with the patient. The patient concurred with the proposed plan, giving informed consent.  The site of surgery was properly noted/marked if necessary per policy. The patient has been actively warmed in preoperative area. Preoperative antibiotics have been ordered and given within 1 hours of incision. Venous thrombosis prophylaxis are not indicated.    Procedure Details: Indications for procedure:     I met and evaluated the patient in the preoperative holding area today prior to the patient receiving any sedation or other medications which may alter their mental status. I confirmed their identity via the facility's protocol. I reviewed the patient's history, physical exam, and recommendation for surgery. The indications for surgical versus nonsurgical management of the condition were discussed, including the inherent risks, benefits, prognosis of the condition.  The risks of surgery include, but are not limited to, pain, bleeding, infection, tendon  damage, nerve damage, vessel damage, and need for further surgery.  The risks also include wound healing problems, decreased long-term functionality of the wrist and hand, tendon irritation, tendon rupture, and possible need for therapy for an optimum outcome.  There is a risk of complex regional pain syndrome, incomplete recovery, incomplete relief or worsening of symptoms, and recurrence.  I we also discussed that medical complications are possible during and after surgery related to either anesthesia or the surgical procedure itself. Specific discussion of the risks of the proposed anesthetic plan will be discussed by the patient's anesthesia provider. All risks were reviewed in layman´s terms. The patient was given ample time to ask appropriate questions and appeared to be satisfied with the answers provided. All questions were answered and no guarantees have been given regarding the patient's condition and treatment recommendations. The general plan for the postoperative rehabilitation course, including possible need for therapy, was reviewed at great length. Informed consent was signed by all appropriate parties. The surgical site was marked in ink by myself.        Procedure in Detail:  The patient was transported to the operating room and placed in the supine position on the operating table. All extremities and prominences were appropriately padded. Adequate anesthesia was induced. A non-sterile tourniquet was applied high on the right arm. The right arm was prepped and draped in standard sterile fashion. A time-out was conducted prior to beginning the operation to confirm the patient's identity, planned procedures, laterality of procedures, and that appropriate antibiotics had been administered within 30 minutes of incision. The right upper extremity was exsanguinated with an Esmarch bandage, and the tourniquet was inflated to 250mmHg.     The curvilinear incision was made over the lateral side of the  elbow.  This was centered over the supracondylar ridge, lateral epicondyle, and radial head.  Full-thickness skin flaps were elevated.  Dissection was carried down to the common extensor origin.  The supracondylar region was palpated.  A 15 blade was used to make a decision on the supracondylar ridge enter into the elbow joint.  The capsule was lifted off the supracondylar ridge and dissection was carried down distally just anterior to the midline of the lateral epicondyle.  This was  extended distally just anterior to the midline of the radial head.  This split the EDC tendon is elevated at off the lateral epicondyle.  The capsule was elevated with this sleeve of tissue.  Hohmann retractors were placed along the medial side of the humerus to elevate the joint capsule.  There was 1 large bony capitellar fragment which had to be rotated to 180 degrees in the 100% displaced.  This was free of soft tissue fragments and was removed from the wound.  The wound was irrigated and hematoma was removed.  The remaining hematoma and tissue along the distal humerus was gently debrided with a curette and 15 blade.  The capitellar fragment was then placed back and it was anatomically reduced based on fracture lines.  There was a small amount of comminution at the most proximal extent of the fragment.  Several K wires were placed from anterior to posterior provisionally fix the fracture.  The K wires were also placed very wanted the screw fixation.  Fluoroscopy demonstrated an anatomic reduction with proper K wire placement.  The K wires were then measured for appropriate length cannulated screws.  The K wires were then overdrilled just through the capitellar fracture fragments for the corresponding AccuTrack three 2.5 mm headless screw.  The appropriate sized screws were then placed.  In total there were 3 cannulated screws.  The remaining K wires were removed.  Fluoroscopy demonstrated proper screw length and placement with  anatomic fixation.  The fracture fragment was stable to stress testing.  The wounds were copiously irrigated.  The lateral collateral ligament remained intact and stable throughout the procedure.  The capsule and EDC split was closed with a buried 3-0 Ethibond suture.  Stress testing her elbow demonstrated that the elbow was stable and the lateral collateral ligament was intact.  The skin was closed with a 2-0 Vicryl suture in buried fashion and a running 3-0 Monocryl with skin glue and Steri-Strips.  A well-padded long-arm splint with the arm in 70 degrees of flexion was placed.    The patient was transferred to the hospital bed and transported to the post-anesthesia care unit in good condition having tolerated the procedure well. All sponge, needle, and instrument counts were correct x2. Postoperatively, the digits were pink and well perfused and the hand compartments and soft tissues were supple. No complications were apparent.    Postoperative Plan:  The patient will be nonweightbearing on their operative site. Their dressing will be removed in office  They will return to clinic in 2 weeks. X-rays are needed on return visit.  Complications:  None; patient tolerated the procedure well.    Disposition: PACU - hemodynamically stable.  Condition: stable         Additional Details: See op note    Attending Attestation: I performed the procedure.    Barry Langucler  Phone Number: [phone]

## 2024-12-03 NOTE — PROGRESS NOTES
[facility]  Hand and Upper Extremity Service  Post Operative visit        Date of surgery: 11/13/2024  Surgery(s) performed: ORIF right capitellum        Subjective report:   Patient presents for second postop follow-up.  She presents for clinical exam and suture removal.  She has been wearing her hinged elbow brace from 3-110 degrees.  She has noted significant improvement in her range of motion since her last visit.  She has noted significant improvement in her pain.  She denies numbness and tingling.  Overall she is doing very well        Exam findings; right upper extremity  Hinged elbow brace was removed.  Sutures remain intact.  Incision is well-approximated.  Nylon sutures were removed in office.  There was 1 buried Vicryl suture that was prominent and poking through the skin.  This was cut flush at the level of the skin.  Skin edges are well-approximated.  No erythema warmth or drainage.  She has 30 to 100 degrees range of motion.  She has full pronation and supination.  AIN, PIN, ulnar motors intact.  Sensation intact light touch radial, ulnar, median nerves.  Brisk capillary refill        Radiograph findings: XR of the right elbow dated 11/22/2024 was reviewed in office today  Anatomic alignment of a capitellum fracture with appropriate screw placement and hardware length.  No signs of failure        Plan:   We will open up her hinged elbow brace fully which allows for 0 to 110 degrees range of motion.  She will wear this to the 4-week lalo and then she can gradually wean out of it.  She will still remain 1 to 2 pounds weightbearing until follow-up.  She will remain out of work.  She can continue to to work on range of motion.  She may take this brace off for gentle range of motion.  She will get to occupational therapy to work on range of motion.     She will follow-up in 4 weeks.  X-rays of right elbow out of brace will be obtained    Medications Prescribed: None            Barry Gtz DO  [facility] [employer]  Department of Orthopaedic Surgery  Hand and Upper Extremity Surgery  [facility]     Dictation performed with the use of voice recognition software. Syntax and grammatical errors may exist.

## 2024-12-04 DIAGNOSIS — F33.1 MODERATE EPISODE OF RECURRENT MAJOR DEPRESSIVE DISORDER: ICD-10-CM

## 2024-12-04 RX ORDER — MIRTAZAPINE 45 MG/1
45 TABLET, FILM COATED ORAL NIGHTLY
Qty: 90 TABLET | Refills: 1 | OUTPATIENT
Start: 2024-12-04 | End: 2025-01-03

## 2024-12-08 DIAGNOSIS — F33.1 MODERATE EPISODE OF RECURRENT MAJOR DEPRESSIVE DISORDER: ICD-10-CM

## 2024-12-08 RX ORDER — MIRTAZAPINE 45 MG/1
45 TABLET, FILM COATED ORAL NIGHTLY
Qty: 30 TABLET | Refills: 0 | OUTPATIENT
Start: 2024-12-08 | End: 2025-01-07

## 2024-12-23 ENCOUNTER — EVALUATION (OUTPATIENT)
Dept: OCCUPATIONAL THERAPY | Facility: CLINIC | Age: 44
End: 2024-12-23
Payer: COMMERCIAL

## 2024-12-23 DIAGNOSIS — S42.451D: ICD-10-CM

## 2024-12-23 DIAGNOSIS — M25.621 ELBOW STIFFNESS, RIGHT: Primary | ICD-10-CM

## 2024-12-23 PROCEDURE — 97140 MANUAL THERAPY 1/> REGIONS: CPT | Mod: GO

## 2024-12-23 PROCEDURE — 97165 OT EVAL LOW COMPLEX 30 MIN: CPT | Mod: GO

## 2024-12-23 NOTE — PROGRESS NOTES
Occupational Therapy  Occupational Therapy Orthopedic Evaluation    Patient Name: Melina Rivera  MRN: [id number]  Today's Date: 12/23/2024  Time Calculation  Start Time: 1335  Stop Time: 1415  Time Calculation (min): 40 min  1 eval 30 minutes  1 manual therapy  Insurance:    Visit number: 1   Authorization info: No auth   Insurance Type: [employer] [place]    General:  Reason for visit: ORIF right capitellum   Referred by: Dr. Gtz    Current Problem  1. Elbow stiffness, right        2. Closed fracture of capitellum of humerus, right, with routine healing, subsequent encounter  Referral to Occupational Therapy          Precautions: 1-2 lb wt restriction        Medical History Form: Reviewed (scanned into chart)    Subjective:   Chief Complaint: Elbow stiffness/pain   Onset: 10/31/2024  DOUG: Fall   DOS: 11/13/2024      Hand Dominance: Right    Current Condition since injury:   better      PAIN     Location: R lateral forearm/elbow   Description: tender  3/10 R lateral forearm/elbow   Aggravating Factors: Elbow flexion and Elbow extension , opening doors  Relieving Factors:  Ice and Heat     Relevant Information (PMH & Previous Tests/Imaging): XR  Previous Interventions/Treatments: None     Prior Level of Function (PLOF)  Exercise/Physical Activity: Read, adult coloring   Work/School: , typing   Current ADL/IADL Status: independent     Patients Living Environment: Reviewed and no concern    Primary Language: English    There are no spiritual/cultural practices/values/needs that are important to know      Pt goals for therapy: straighten arm more    Red Flags: Do you have any of the following? No  Fever/chills, unexplained weight changes, dizziness/fainting, unexplained change in bowel or bladder functions, unexplained malaise or muscle weakness, night pain/sweats, numbness or tingling    Objective:    Elbow AROM (degrees)   R L   Extension -18 0   Flexion 130 140   Pronation 90 90    Supination 90 90           Elbow Strength Measures (MMT)   R L   Extension 3    Flexion 3    Pronation 4    Supination 4       R  strength: 20 lbs   L  strength: 60 lbs     Special Tests    Tendonitis  Cozens Test:   Nur Test:   Maudsley's Test:   Golfers Elbow Test:   Pronator Teres Test:   Hook Test:   Nerve  Tinels Sign (Ulnar):   Elbow Flexion Test:   Radial Tunnel Test:       Physical Observation: Scar is well healed. Scar is tender to the touch  Edema: Minimal    Sensory: wnl  Numbness/Tingling: none          Outcome Measures:  UEFI: 17/80    EDUCATION: home exercise program, plan of care, activity modifications, pain management, and injury pathology.       Goals:  1. Pt will increase R elbow flexion to 140 and elbow extension to -5 degrees or better to improve performance with ADL's  2. Pt will increase R  strength 20 lbs to improve ability to open jars and carry grocery bags  3. Pt will have 5/5 R elbow flex/ext strength to improve ability to open doors   4. Pt will score a 50 or better on UEFI  5. Pt will have no more than 1/10 R elbow pain with pushing and pulling activities     Plan of care was developed with input and agreement by the patient    Treatments:     Modalities:       min       Therapeutic Exercise:    min       Manual Therapy:     10 min   Scar massage R elbow   Passive elbow extension stretch     Therapeutic Activity:     min       Neuromuscular Re-education:   min       Orthosis:       min      Wound Care:      min      Self Care:       min      Other Treatment:     min      Assessment: Patient is a 43 yo RHD female  s/p ORIF R capitellum fx resulting decreased elbow ROM, pain, weakness, and  limited participation in pain-free ADLs and inability to perform at their prior level of function. Pt would benefit from occupational therapy to address the impairments found & listed previously in the objective section in order to return to safe and pain-free ADLs and prior level of  function.       Clinical Presentation: Evolving with changing characteristics       Plan:      Planned Interventions include: therapeutic exercise, therapeutic activity, self-care home management, manual therapy, therapeutic activities, neuromuscular coordination,electric stimulation, fluidotherapy, ultrasound, kinesiotaping, orthosis fabrication  Frequency: 1 x Week  Duration: 6 Weeks  Rehab potential/prognosis: Good       Awais Mendez, OT

## 2024-12-30 ENCOUNTER — APPOINTMENT (OUTPATIENT)
Dept: ORTHOPEDIC SURGERY | Facility: HOSPITAL | Age: 44
End: 2024-12-30
Payer: COMMERCIAL

## 2024-12-30 DIAGNOSIS — M25.521 RIGHT ELBOW PAIN: Primary | ICD-10-CM

## 2025-01-07 ENCOUNTER — APPOINTMENT (OUTPATIENT)
Dept: ORTHOPEDIC SURGERY | Facility: HOSPITAL | Age: 45
End: 2025-01-07
Payer: COMMERCIAL

## 2025-01-07 ENCOUNTER — APPOINTMENT (OUTPATIENT)
Dept: RADIOLOGY | Facility: HOSPITAL | Age: 45
End: 2025-01-07
Payer: COMMERCIAL

## 2025-01-13 ENCOUNTER — APPOINTMENT (OUTPATIENT)
Dept: OCCUPATIONAL THERAPY | Facility: CLINIC | Age: 45
End: 2025-01-13
Payer: COMMERCIAL

## 2025-01-14 ENCOUNTER — APPOINTMENT (OUTPATIENT)
Dept: RADIOLOGY | Facility: HOSPITAL | Age: 45
End: 2025-01-14
Payer: COMMERCIAL

## 2025-01-14 ENCOUNTER — APPOINTMENT (OUTPATIENT)
Dept: ORTHOPEDIC SURGERY | Facility: HOSPITAL | Age: 45
End: 2025-01-14
Payer: COMMERCIAL

## 2025-01-21 ENCOUNTER — APPOINTMENT (OUTPATIENT)
Dept: HEMATOLOGY/ONCOLOGY | Facility: HOSPITAL | Age: 45
End: 2025-01-21
Payer: COMMERCIAL

## 2025-01-28 ENCOUNTER — HOSPITAL ENCOUNTER (OUTPATIENT)
Dept: RADIOLOGY | Facility: HOSPITAL | Age: 45
End: 2025-01-28
Payer: COMMERCIAL

## 2025-01-28 ENCOUNTER — APPOINTMENT (OUTPATIENT)
Dept: ORTHOPEDIC SURGERY | Facility: HOSPITAL | Age: 45
End: 2025-01-28
Payer: COMMERCIAL

## 2025-01-29 ENCOUNTER — APPOINTMENT (OUTPATIENT)
Dept: OCCUPATIONAL THERAPY | Facility: CLINIC | Age: 45
End: 2025-01-29
Payer: COMMERCIAL

## 2025-02-05 ENCOUNTER — APPOINTMENT (OUTPATIENT)
Dept: OCCUPATIONAL THERAPY | Facility: CLINIC | Age: 45
End: 2025-02-05
Payer: COMMERCIAL

## 2025-02-11 ENCOUNTER — LAB (OUTPATIENT)
Dept: LAB | Facility: CLINIC | Age: 45
End: 2025-02-11
Payer: COMMERCIAL

## 2025-02-11 ENCOUNTER — APPOINTMENT (OUTPATIENT)
Dept: RADIOLOGY | Facility: CLINIC | Age: 45
End: 2025-02-11
Payer: COMMERCIAL

## 2025-02-11 DIAGNOSIS — I70.1 RENAL ARTERY STENOSIS (CMS-HCC): ICD-10-CM

## 2025-02-11 DIAGNOSIS — C77.9 MELANOMA METASTATIC TO LYMPH NODE (MULTI): ICD-10-CM

## 2025-02-11 DIAGNOSIS — I10 PRIMARY HYPERTENSION: ICD-10-CM

## 2025-02-11 LAB
ALBUMIN SERPL BCP-MCNC: 4.4 G/DL (ref 3.4–5)
ALP SERPL-CCNC: 55 U/L (ref 33–110)
ALT SERPL W P-5'-P-CCNC: 25 U/L (ref 7–45)
ANION GAP SERPL CALC-SCNC: 14 MMOL/L (ref 10–20)
AST SERPL W P-5'-P-CCNC: 14 U/L (ref 9–39)
BASOPHILS # BLD AUTO: 0.09 X10*3/UL (ref 0–0.1)
BASOPHILS NFR BLD AUTO: 0.8 %
BILIRUB SERPL-MCNC: 0.6 MG/DL (ref 0–1.2)
BUN SERPL-MCNC: 11 MG/DL (ref 6–23)
CALCIUM SERPL-MCNC: 9.5 MG/DL (ref 8.6–10.3)
CHLORIDE SERPL-SCNC: 103 MMOL/L (ref 98–107)
CO2 SERPL-SCNC: 26 MMOL/L (ref 21–32)
CREAT SERPL-MCNC: 0.75 MG/DL (ref 0.5–1.05)
EGFRCR SERPLBLD CKD-EPI 2021: >90 ML/MIN/1.73M*2
EOSINOPHIL # BLD AUTO: 0.26 X10*3/UL (ref 0–0.7)
EOSINOPHIL NFR BLD AUTO: 2.3 %
ERYTHROCYTE [DISTWIDTH] IN BLOOD BY AUTOMATED COUNT: 14.6 % (ref 11.5–14.5)
GLUCOSE SERPL-MCNC: 99 MG/DL (ref 74–99)
HCT VFR BLD AUTO: 41.7 % (ref 36–46)
HGB BLD-MCNC: 13.5 G/DL (ref 12–16)
IMM GRANULOCYTES # BLD AUTO: 0.04 X10*3/UL (ref 0–0.7)
IMM GRANULOCYTES NFR BLD AUTO: 0.4 % (ref 0–0.9)
LDH SERPL L TO P-CCNC: 123 U/L (ref 84–246)
LYMPHOCYTES # BLD AUTO: 3.37 X10*3/UL (ref 1.2–4.8)
LYMPHOCYTES NFR BLD AUTO: 30.4 %
MCH RBC QN AUTO: 32.9 PG (ref 26–34)
MCHC RBC AUTO-ENTMCNC: 32.4 G/DL (ref 32–36)
MCV RBC AUTO: 102 FL (ref 80–100)
MONOCYTES # BLD AUTO: 0.7 X10*3/UL (ref 0.1–1)
MONOCYTES NFR BLD AUTO: 6.3 %
NEUTROPHILS # BLD AUTO: 6.61 X10*3/UL (ref 1.2–7.7)
NEUTROPHILS NFR BLD AUTO: 59.8 %
NRBC BLD-RTO: 0 /100 WBCS (ref 0–0)
PLATELET # BLD AUTO: 267 X10*3/UL (ref 150–450)
POTASSIUM SERPL-SCNC: 3.9 MMOL/L (ref 3.5–5.3)
PROT SERPL-MCNC: 7.2 G/DL (ref 6.4–8.2)
RBC # BLD AUTO: 4.1 X10*6/UL (ref 4–5.2)
SODIUM SERPL-SCNC: 139 MMOL/L (ref 136–145)
WBC # BLD AUTO: 11.1 X10*3/UL (ref 4.4–11.3)

## 2025-02-11 PROCEDURE — 83615 LACTATE (LD) (LDH) ENZYME: CPT

## 2025-02-11 PROCEDURE — 80053 COMPREHEN METABOLIC PANEL: CPT

## 2025-02-11 PROCEDURE — 36415 COLL VENOUS BLD VENIPUNCTURE: CPT

## 2025-02-11 PROCEDURE — 85025 COMPLETE CBC W/AUTO DIFF WBC: CPT

## 2025-02-12 ENCOUNTER — APPOINTMENT (OUTPATIENT)
Dept: OCCUPATIONAL THERAPY | Facility: CLINIC | Age: 45
End: 2025-02-12
Payer: COMMERCIAL

## 2025-02-13 NOTE — PROGRESS NOTES
[facility]  Hand and Upper Extremity Service  Post Operative visit        Date of surgery: 11/13/2024  Surgery(s) performed: ORIF right capitellum        Subjective report:   Patient is doing pretty well postoperatively.  She denies numbness and tingling.  She does have some mild to moderate pain her pain is rated 4 out of 10.  She has been in her splint since her surgery.  She denies fevers or chills        Exam findings; right upper extremity  Well-padded posterior splint.  This was removed in office.  She has approximately 30 to 100 degrees of flexion however this is limited by pain.  There is no erythema warmth or drainage.  Her incision is healing well and skin edges are approximated.  AIN, PIN, ulnar motors intact.  Sensation intact to light touch to radial, ulnar, median nerves.  Brisk capillary refill     Radiograph findings: XR right elbow taken reviewed in office today  Status post open reduction internal fixation of the right capitellum.  No hardware failure or complications or loosening noted.     Plan: Patient is doing well postoperatively.  She was given a hinged T scope brace this was locked at 30 degrees of extension and fully open.  She will be referred to occupational therapy and may gradually wean out of this.  She will follow-up in 4 weeks for repeat clinical examination and x-rays stitches removed in office today        Medications Prescribed: Hydrocodone           Will Beucler, DO  [facility] [employer]  Department of Orthopaedic Surgery  Hand and Upper Extremity Surgery  [facility]     Dictation performed with the use of voice recognition software. Syntax and grammatical errors may exist.

## 2025-02-14 ENCOUNTER — HOSPITAL ENCOUNTER (OUTPATIENT)
Dept: RADIOLOGY | Facility: CLINIC | Age: 45
Discharge: HOME | End: 2025-02-14
Payer: COMMERCIAL

## 2025-02-14 DIAGNOSIS — C77.9 MELANOMA METASTATIC TO LYMPH NODE (MULTI): ICD-10-CM

## 2025-02-14 PROCEDURE — 71260 CT THORAX DX C+: CPT

## 2025-02-14 PROCEDURE — 2550000001 HC RX 255 CONTRASTS: Performed by: NURSE PRACTITIONER

## 2025-02-14 RX ADMIN — IOHEXOL 75 ML: 350 INJECTION, SOLUTION INTRAVENOUS at 14:23

## 2025-02-19 ENCOUNTER — APPOINTMENT (OUTPATIENT)
Dept: OCCUPATIONAL THERAPY | Facility: CLINIC | Age: 45
End: 2025-02-19
Payer: COMMERCIAL

## 2025-02-26 ENCOUNTER — APPOINTMENT (OUTPATIENT)
Dept: OCCUPATIONAL THERAPY | Facility: CLINIC | Age: 45
End: 2025-02-26
Payer: COMMERCIAL

## 2025-03-05 ENCOUNTER — APPOINTMENT (OUTPATIENT)
Dept: HEMATOLOGY/ONCOLOGY | Facility: CLINIC | Age: 45
End: 2025-03-05
Payer: COMMERCIAL

## 2025-03-12 ENCOUNTER — TELEMEDICINE (OUTPATIENT)
Dept: HEMATOLOGY/ONCOLOGY | Facility: CLINIC | Age: 45
End: 2025-03-12
Payer: COMMERCIAL

## 2025-03-12 DIAGNOSIS — C77.9 MELANOMA METASTATIC TO LYMPH NODE (MULTI): ICD-10-CM

## 2025-03-12 PROCEDURE — 99214 OFFICE O/P EST MOD 30 MIN: CPT | Performed by: NURSE PRACTITIONER

## 2025-03-12 ASSESSMENT — ENCOUNTER SYMPTOMS
CONSTITUTIONAL NEGATIVE: 1
RESPIRATORY NEGATIVE: 1
CARDIOVASCULAR NEGATIVE: 1
MUSCULOSKELETAL NEGATIVE: 1
ENDOCRINE NEGATIVE: 1
PSYCHIATRIC NEGATIVE: 1
HEMATOLOGIC/LYMPHATIC NEGATIVE: 1
GASTROINTESTINAL NEGATIVE: 1
NEUROLOGICAL NEGATIVE: 1
EYES NEGATIVE: 1

## 2025-03-12 NOTE — PROGRESS NOTES
.Patient ID: Melina Rivera is a 45 y.o. female.  Referring Physician: Jenae Rhodes, APRN-CNP  [address] [place] [address]  Primary Care Provider: Ana Herrera MD     Oncology follow up     HPI  Referring Surgeon: Dr. Augie Harris  Dermatologist: Dr. Yessica Salgado  Date of first meeting with our team: 01/02/2022     Date of First meeting with surgical team: 01/03/2022  Melanoma type: Cutaneous Melanoma  Location of primary site: Right lower back melanoma  Date of WLE and SLNB: 01/07/2022  Final pathology: [place] Depth- 2.5mm; Ulceration status- none ; LVI- none ; Other high risk features- none  [place] lymph node status: 3/3 positive- 0.7mm largest deposit.  Final Stage: [id number]- Stage IIIB     Mutation status: BRAF positive.  MRI brain with and without contrast: 01/24/2022- unremarkable  Full body PET scan: 01/24/22- Unremarkable.      Summary:  Ms. Rivera is diagnosed with stage IIIB melanoma. BRAF positive. We recommend one year of adjuvant treatment with immunotherapy. She is on Nivolumab 480mg IV every 4 weeks. Started treatment on March 31, 2022. Surveillance CT scan on 7/13/22 picked up  a new 9mm adrenal nodule. We decided to rescan in 3 months to see the evolution of this nodule. CT scan on 10/7/2022 showed that the adrenal nodule is stable. She completed one year of treatment on 03/01/2023, and is currently on surveillance.      CT scan on 01/03/2023 showing stable changes.   CT scan on 04/03/2023 showed stable changes.  CT scan 11/01/2023 showing stable changes and AALIYAH.   CT scan 04/29/2024 showing AALIYAH. Hypo enhancement of the right kidney noted. Creatinine is rising and she is having right flank pain. Patient sent to ED for evaluation. CT angio performed and no acute issue noted. I made a referral for her to meet with Dr. Reina to discuss renal artery stenosis and HTN.   CT scan 02/14/2025- stable changes and AALIYAH.      Treatment History:    Systemic Treatment  1.  "Nivolumab 480mg (C1- 03/31/22 to 03/01/23)    Subjective   Please refer to \"Notes/Cancer History\" above for complete History of present illness.     Today,    - Overall, feels well.   - Fractured her elbow in November.   - Continues to follow with her PCP.  - Continues skin checks with dermatology. Next 04/2025.  - Has not had a recent morteza ultrasound.      Review of Systems:   Review of Systems   Constitutional: Negative.    HENT:  Negative.     Eyes: Negative.    Respiratory: Negative.     Cardiovascular: Negative.    Gastrointestinal: Negative.    Endocrine: Negative.    Musculoskeletal: Negative.         Elbow fracture   Skin: Negative.    Neurological: Negative.    Hematological: Negative.    Psychiatric/Behavioral: Negative.          Follows with Dr. Trinh.          MEDICAL HISTORY  Melanoma, anxiety, MDD, migraines     FAMILY HISTORY  Family History   Problem Relation Name Age of Onset    Other (gist of colon) Mother      Other (cholangiocarcinoma) Father      Other (papillary carcinoma) Brother      Other (pten gene mutation) Brother      Other (b cell lymphona) Mother's Sister      Throat cancer Mother's Brother      Colon cancer Maternal Grandfather Don     Bilateral breast cancer Paternal Grandmother      Other (myeloid leukemia) Paternal Cousin         TOBACCO HISTORY  Tobacco Use: High Risk (12/3/2024)    Patient History     Smoking Tobacco Use: Every Day     Smokeless Tobacco Use: Never     Passive Exposure: Not on file       SOCIAL HISTORY  Social Connections: Not on file   , lives with her . No children. Works full time     Outpatient Medication Profile:  Current Outpatient Medications on File Prior to Visit   Medication Sig Dispense Refill    irbesartan (Avapro) 300 mg tablet Take 1 tablet (300 mg) by mouth once daily. 90 tablet 3    mirtazapine (Remeron) 45 mg tablet TAKE 1 TABLET (45 MG) BY MOUTH ONCE DAILY AT BEDTIME. 30 tablet 0    multivitamin tablet Take 1 tablet by mouth once " daily.       No current facility-administered medications on file prior to visit.         Performance Status:  Asymptomatic     Vitals and Measurements:   There were no vitals filed for this visit.  Telephone visit     Physical Exam:   Physical Exam   Telephone visit     Lab Results:  I have reviewed these laboratory results:     Lab Results   Component Value Date    WBC 11.1 02/11/2025    HGB 13.5 02/11/2025    HCT 41.7 02/11/2025     (H) 02/11/2025     02/11/2025         Chemistry    Lab Results   Component Value Date/Time     02/11/2025 1623    K 3.9 02/11/2025 1623     02/11/2025 1623    CO2 26 02/11/2025 1623    BUN 11 02/11/2025 1623    CREATININE 0.75 02/11/2025 1623    Lab Results   Component Value Date/Time    CALCIUM 9.5 02/11/2025 1623    ALKPHOS 55 02/11/2025 1623    AST 14 02/11/2025 1623    ALT 25 02/11/2025 1623    BILITOT 0.6 02/11/2025 1623            Lab Results   Component Value Date    TSH 1.79 04/03/2023        Radiology Result:  I have reviewed the latest Imaging in PACS and the findings are noted in this note. I discussed the results of the latest imaging with the patient. All previous imaging were reviewed at the time it was completed. Full records are available in the EMR for review as well.     CT CHEST ABDOMEN PELVIS W IV CONTRAST; 2/14/2025 2:21 pm     IMPRESSION:  Melanoma restaging scan as compared to prior CT:      1. No evidence of metastatic disease within the chest, abdomen, or  pelvis.  2. Similar chronic and ancillary findings as above.       Pathology Results:  I have reviewed the full pathology report recorded in the EMR. The pertinent portions indicating diagnosis are listed here in the note. for details please refer to the full report recorded in the EMR.    Dermatopathology [Jan 14 2022 2:08PM] ([id number])    Specimens: NODE, RIGHT AXILLARY SENTINEL LYMPH NODE # 1, COUNT = 229 /NODE,  RIGHT AXILLARY SENTINEL LYMPH NODE #2, COUNT =265 /NODE,  LEFT INGUINAL SENTINEL LYMPH NODE #1, COUNT = 712 /SKIN, LEFT UPPER BACK SKIN LESION /SKIN, RIGHT LOWER BACK MELANOMA /SKIN, RIGHT BACK INFERIOR STANDING CONE STITCH BRITT, APEX /RIGHT BACK SUPERIOR  STANDING CONE STITCH MARKS, APEX /Received in formalin is a tan-yellow irregularly shaped piece of skin     Name GRETTA MOSQUERA     Accession #:    Pathologist: SHERLEY STRICKLAND MD   Date of Procedure: 1/7/2022   Date Received:  1/10/2022   Date Reported 1/14/2022   Submitting Physician: DEE DEE SAWANT MD   Location: [facility] Other External #     FINAL DIAGNOSIS   A. NODE, RIGHT AXILLARY SENTINEL LYMPH NODE # 1, COUNT = 229, EXCISION:   MICROMETASTATIC  MALIGNANT MELANOMA IN 1/1 LYMPH NODE, SEE NOTE.     Note: Microscopic examination reveals a lymph node. In the subcapsular and parenchymal space there are collections of atypical epithelioid melanocytes that stain with antibodies against Melan-A,  SOX-10, and HMB45. The largest deposit is 0.6 mm in greatest diameter. No extracapsular extension is seen.     B. NODE, RIGHT AXILLARY SENTINEL LYMPH NODE #2, COUNT =265, EXCISION:   METASTATIC MALIGNANT MELANOMA IN 1/1 LYMPH NODE AND TATTOO,SEE  NOTE.     Note: Microscopic examination reveals subcapsular deposits of atypical epithelioid cells that stain with antibodies against Melan-A, SOX-10 and HMB45. All control slides stain appropriately. No extracapsular extension is seen.      C. NODE, LEFT INGUINAL SENTINEL LYMPH NODE #1, COUNT = 712, EXCISION:   METASTATIC MALIGNANT MELANOMA IN 1/1 LYMPH NODE AND TATTOO, SEE NOTE.     Note: Microscopic examination reveals a lymph node with black tattoo pigment. In the subcapsular  and parenchymal space there are collections of atypical epithelioid melanocytes that stain with antibodies against Melan-A, SOX-10, and HMB45. All control slides stain appropriately. This deposit is 0.7 mm in greatest diameter and no extracapsular extension  is seen.     D. SKIN, LEFT UPPER BACK SKIN  LESION, EXCISION:   CHANGES CONSISTENT WITH PREVIOUS PROCEDURE, INKED MARGINS FREE IN PLANES OF   SECTIONS EXAMINED AND TATTOO WITHOUT RESIDUAL ATYPICAL MELANOCYTIC NEOPLASM   SEEN.      E. SKIN, RIGHT LOWER BACK MELANOMA, EXCISION:   CHANGES CONSISTENT WITH PREVIOUS PROCEDURE, INKED MARGINS FREE IN PLANES OF   SECTIONS EXAMINED WITHOUT RESIDUAL ATYPICAL MELANOCYTIC NEOPLASM SEEN.     F. SKIN, RIGHT BACK INFERIOR STANDING  CONE STITCH BRITT, APEX, EXCISION:   UNREMARKABLE SKIN.     G. RIGHT BACK SUPERIOR STANDING CONE STITCH BRITT, APEX, EXCISION:   UNREMARKABLE SKIN.     Electronically Signed Out By SHERLEY STRICKLAND MD/SALINA   By the signature on  this report, the individual or group listed as making the   Final Interpretation/Diagnosis certifies that they have reviewed this case.     Addendum/Procedures:   Special Oncology Report Date Ordered: 2/2/2022 Status: Signed Out   Date  Complete: 2/2/2022   Date Reported: 2/2/2022     Addendum Diagnosis   TEST: Solid Focus Tumor DNA Panel   SPECIMEN: FFPE, Skin, Back, Right, Biopsy, [id number]   DISEASE DIAGNOSIS: Melanoma   Estimated Tumor Content: 30%   COLLECTION DATE: 12/29/2021   RECEIVED DATE: 01/28/2022   REPORT DATE: 02/02/2022     MICROSATELLITE STATUS: Microsatellite Stable (KATHERIN)   DISEASE ASSOCIATED GENOMIC FINDINGS:   BRAF p.V600E ([id number]: [id number])      Assessment and Plan:   Assessment/Plan   Mrs. Melina Rivera is a 45 y.o. female with a diagnosis of stage IIIB melanoma of the back. WLE/SLNB 01/07/2022. Completed one year of adjuvant Nivolumab 03/01/2023. Currently on surveillance. Please see the evolution of the case listed above in the cancer history. Last CT scan 02/14/2025 showing stable changes and AALIYAH.      The surveillance plan for stage IIIB melanoma involves  1. Clinical exam and history every 6 months for the first two years and then every 12 months to complete 5 years. RTC 02/17/2026 at [facility].   2. CT scan every 6 months for first two years and  then every 12 months to complete 5 years. CT 02/12/2026.  3. Dermatology follow up for skin check. Next 04/2025.  4. Follow up with Surgical Oncology for Ultrasound of the lymph node basin. I reached out to their team today to schedule a visit.      Patient can always call me earlier if he notices any changes in his status, or any physician involved in the care feels that the patient needs to be seen by our team.     # Elevated BP   - Checks her BP at home.   - Following with her PCP.      # Anxiety  - Following with Dr. Trinh.      # Health maintenance  - Follows with PCP for wellness visits and age appropriate cancer screenings. Next visit 04/09/2025.     DISCLAIMER:   In preparing for this visit and writing this note, I reviewed all the previous electronic medical records (labs, imaging and medical charts) of the patient available in the physician portal. Significant findings which helped in decision making are recorded  in this chart.     The plan was discussed with the patient. We gave her ample opportunities to ask questions. All questions were answered to her satisfaction and she verbalized understanding.

## 2025-04-03 DIAGNOSIS — C43.9 MALIGNANT MELANOMA, UNSPECIFIED SITE (MULTI): ICD-10-CM

## 2025-04-09 ENCOUNTER — APPOINTMENT (OUTPATIENT)
Dept: PRIMARY CARE | Facility: CLINIC | Age: 45
End: 2025-04-09
Payer: COMMERCIAL

## 2025-04-09 ENCOUNTER — TELEPHONE (OUTPATIENT)
Dept: PRIMARY CARE | Facility: CLINIC | Age: 45
End: 2025-04-09

## 2025-04-09 VITALS
BODY MASS INDEX: 32.2 KG/M2 | HEART RATE: 80 BPM | DIASTOLIC BLOOD PRESSURE: 80 MMHG | SYSTOLIC BLOOD PRESSURE: 132 MMHG | OXYGEN SATURATION: 98 % | HEIGHT: 62 IN | RESPIRATION RATE: 18 BRPM | WEIGHT: 175 LBS

## 2025-04-09 DIAGNOSIS — Z00.00 WELL ADULT EXAM: Primary | ICD-10-CM

## 2025-04-09 DIAGNOSIS — Z00.00 WELL ADULT EXAM: ICD-10-CM

## 2025-04-09 DIAGNOSIS — Z12.11 SCREENING FOR COLON CANCER: ICD-10-CM

## 2025-04-09 DIAGNOSIS — Z12.31 ENCOUNTER FOR SCREENING MAMMOGRAM FOR MALIGNANT NEOPLASM OF BREAST: ICD-10-CM

## 2025-04-09 PROCEDURE — 3075F SYST BP GE 130 - 139MM HG: CPT | Performed by: FAMILY MEDICINE

## 2025-04-09 PROCEDURE — 3079F DIAST BP 80-89 MM HG: CPT | Performed by: FAMILY MEDICINE

## 2025-04-09 PROCEDURE — 99396 PREV VISIT EST AGE 40-64: CPT | Performed by: FAMILY MEDICINE

## 2025-04-09 PROCEDURE — 3008F BODY MASS INDEX DOCD: CPT | Performed by: FAMILY MEDICINE

## 2025-04-09 ASSESSMENT — PROMIS GLOBAL HEALTH SCALE
RATE_SOCIAL_SATISFACTION: GOOD
RATE_MENTAL_HEALTH: FAIR
CARRYOUT_PHYSICAL_ACTIVITIES: MOSTLY
RATE_AVERAGE_FATIGUE: MODERATE
EMOTIONAL_PROBLEMS: OFTEN
RATE_PHYSICAL_HEALTH: FAIR
CARRYOUT_SOCIAL_ACTIVITIES: FAIR
RATE_AVERAGE_PAIN: 3
RATE_QUALITY_OF_LIFE: GOOD
RATE_GENERAL_HEALTH: GOOD

## 2025-04-09 ASSESSMENT — PATIENT HEALTH QUESTIONNAIRE - PHQ9
10. IF YOU CHECKED OFF ANY PROBLEMS, HOW DIFFICULT HAVE THESE PROBLEMS MADE IT FOR YOU TO DO YOUR WORK, TAKE CARE OF THINGS AT HOME, OR GET ALONG WITH OTHER PEOPLE: NOT DIFFICULT AT ALL
2. FEELING DOWN, DEPRESSED OR HOPELESS: SEVERAL DAYS
1. LITTLE INTEREST OR PLEASURE IN DOING THINGS: SEVERAL DAYS
SUM OF ALL RESPONSES TO PHQ9 QUESTIONS 1 AND 2: 2

## 2025-04-09 ASSESSMENT — LIFESTYLE VARIABLES
AUDIT-C TOTAL SCORE: 1
SKIP TO QUESTIONS 9-10: 1
HOW OFTEN DO YOU HAVE A DRINK CONTAINING ALCOHOL: MONTHLY OR LESS
HOW MANY STANDARD DRINKS CONTAINING ALCOHOL DO YOU HAVE ON A TYPICAL DAY: 1 OR 2
HOW OFTEN DO YOU HAVE SIX OR MORE DRINKS ON ONE OCCASION: NEVER

## 2025-04-09 ASSESSMENT — PAIN SCALES - GENERAL: PAINLEVEL_OUTOF10: 0-NO PAIN

## 2025-04-09 ASSESSMENT — COLUMBIA-SUICIDE SEVERITY RATING SCALE - C-SSRS: 1. IN THE PAST MONTH, HAVE YOU WISHED YOU WERE DEAD OR WISHED YOU COULD GO TO SLEEP AND NOT WAKE UP?: NO

## 2025-04-09 NOTE — PROGRESS NOTES
"Subjective   Patient ID: Melina Rivera is a 45 y.o. female who presents for Annual Exam (Pt is in for annual physical.) and Hand Pain (Pt reports R hand discomfort and reports bumps in palm of hand.).    HPI  Patient Care Team:  Ana Herrera MD as PCP - General (Family Medicine)  Ana Herrera MD as PCP - [facility] PCP  Santana Trinh MD as Psychiatrist (Psychiatry)    Melina Rivera is seen for comprehensive physical exam.  PMH, PSH, family history and social history were reviewed and updated. Quit smoking 2 weeks ago after accupuncture/laser.   GYN - hysterectomy for fibroids,   Melanoma - back 2022, seeing dermatologist and oncology (Sidiropoulos)  PEPE - sees specialist and doing well     Review of Systems   Constitutional:  Negative for chills, fever and unexpected weight change.   HENT:  Negative for congestion, ear pain, hearing loss, rhinorrhea, sore throat and trouble swallowing.    Eyes:  Negative for visual disturbance.   Respiratory:  Negative for cough and shortness of breath.    Cardiovascular:  Negative for chest pain and leg swelling.   Gastrointestinal:  Negative for abdominal pain, blood in stool, nausea and vomiting.   Genitourinary:  Negative for dysuria, hematuria, vaginal bleeding and vaginal discharge.   Musculoskeletal:  Negative for arthralgias and myalgias.   Skin:  Negative for rash.   Neurological:  Negative for dizziness, weakness and numbness.   Psychiatric/Behavioral:  Negative for dysphoric mood. The patient is not nervous/anxious.        Objective   /80 (BP Location: Left arm, Patient Position: Sitting)   Pulse 80   Resp 18   Ht 1.562 m (5' 1.5\")   Wt 79.4 kg (175 lb)   SpO2 98%   BMI 32.53 kg/m²     Physical Exam  Vitals and nursing note reviewed.   Constitutional:       General: She is not in acute distress.  HENT:      Right Ear: Tympanic membrane and ear canal normal.      Left Ear: Tympanic membrane and ear canal normal.      Nose: Nose normal.      " Mouth/Throat:      Mouth: Mucous membranes are moist.   Eyes:      Extraocular Movements: Extraocular movements intact.      Conjunctiva/sclera: Conjunctivae normal.      Pupils: Pupils are equal, round, and reactive to light.   Neck:      Vascular: No carotid bruit.   Cardiovascular:      Rate and Rhythm: Normal rate and regular rhythm.      Pulses:           Dorsalis pedis pulses are 2+ on the right side and 2+ on the left side.   Pulmonary:      Effort: Pulmonary effort is normal.      Breath sounds: Normal breath sounds.   Abdominal:      General: Abdomen is flat. Bowel sounds are normal.      Palpations: Abdomen is soft.      Tenderness: There is no abdominal tenderness.   Musculoskeletal:         General: Normal range of motion.      Cervical back: Neck supple.      Right lower leg: No edema.      Left lower leg: No edema.   Lymphadenopathy:      Cervical: No cervical adenopathy.   Skin:     General: Skin is warm.   Neurological:      General: No focal deficit present.      Mental Status: She is alert.   Psychiatric:         Mood and Affect: Mood normal.         Assessment/Plan   Assessment & Plan  Well adult exam  Preventative measures discussed in detail.  Immunizations reviewed and discussed.         Encounter for screening mammogram for malignant neoplasm of breast    Orders:    BI mammo bilateral screening tomosynthesis; Future    Screening for colon cancer  Screening options for colon cancer discussed in detail including colonoscopy, Cologuard, and FIT testing.   Orders:    Colonoscopy Screening; Average Risk Patient; Future      Follow up 1 Year, sooner with any problems or concerns.

## 2025-04-10 ENCOUNTER — APPOINTMENT (OUTPATIENT)
Dept: RADIOLOGY | Facility: CLINIC | Age: 45
End: 2025-04-10
Payer: COMMERCIAL

## 2025-04-13 ASSESSMENT — ENCOUNTER SYMPTOMS
DYSURIA: 0
COUGH: 0
HEMATURIA: 0
VOMITING: 0
UNEXPECTED WEIGHT CHANGE: 0
MYALGIAS: 0
BLOOD IN STOOL: 0
WEAKNESS: 0
FEVER: 0
ARTHRALGIAS: 0
TROUBLE SWALLOWING: 0
NUMBNESS: 0
CHILLS: 0
NAUSEA: 0
NERVOUS/ANXIOUS: 0
RHINORRHEA: 0
SORE THROAT: 0
SHORTNESS OF BREATH: 0
DIZZINESS: 0
ABDOMINAL PAIN: 0
DYSPHORIC MOOD: 0

## 2025-04-19 ENCOUNTER — APPOINTMENT (OUTPATIENT)
Dept: RADIOLOGY | Facility: CLINIC | Age: 45
End: 2025-04-19
Payer: COMMERCIAL

## 2025-04-24 ENCOUNTER — APPOINTMENT (OUTPATIENT)
Dept: RADIOLOGY | Facility: CLINIC | Age: 45
End: 2025-04-24
Payer: COMMERCIAL

## 2025-05-01 ENCOUNTER — APPOINTMENT (OUTPATIENT)
Dept: RADIOLOGY | Facility: CLINIC | Age: 45
End: 2025-05-01
Payer: COMMERCIAL

## 2025-05-15 ENCOUNTER — APPOINTMENT (OUTPATIENT)
Dept: RADIOLOGY | Facility: CLINIC | Age: 45
End: 2025-05-15
Payer: COMMERCIAL

## 2025-05-29 ENCOUNTER — APPOINTMENT (OUTPATIENT)
Dept: RADIOLOGY | Facility: CLINIC | Age: 45
End: 2025-05-29
Payer: COMMERCIAL

## 2025-06-07 ENCOUNTER — APPOINTMENT (OUTPATIENT)
Dept: RADIOLOGY | Facility: CLINIC | Age: 45
End: 2025-06-07
Payer: COMMERCIAL

## 2025-06-14 ENCOUNTER — APPOINTMENT (OUTPATIENT)
Dept: RADIOLOGY | Facility: CLINIC | Age: 45
End: 2025-06-14
Payer: COMMERCIAL

## 2026-04-10 ENCOUNTER — APPOINTMENT (OUTPATIENT)
Dept: PRIMARY CARE | Facility: CLINIC | Age: 46
End: 2026-04-10
Payer: COMMERCIAL

## (undated) DEVICE — DRAPE, UNDERBUTTOCKS, W/FLUID CONTROL POUCH

## (undated) DEVICE — SEAL, UNIVERSAL 5-8MM  XI

## (undated) DEVICE — PREP TRAY, SKIN, DRY, W/GLOVES

## (undated) DEVICE — CUFF, TOURNIQUET, 18 X 4, DUAL PORT/SNGL BLADDER, DISP, LF

## (undated) DEVICE — Device

## (undated) DEVICE — SUTURE, VICRYL, 0, 36 IN, CT-1, UNDYED

## (undated) DEVICE — CARE KIT, LAPAROSCOPIC, ADVANCED

## (undated) DEVICE — COVER, TIP HOT SHEARS ENDOWRIST

## (undated) DEVICE — BANDAGE, ELASTIC, MATRIX, SELF-CLOSURE, 3 IN X 5 YD, LF

## (undated) DEVICE — DRAPE, SHEET, LARGE, 70 X 85IN, STERILE

## (undated) DEVICE — SUTURE, VICRYL, 4-0, 18 IN, PS2, UNDYED

## (undated) DEVICE — DRAPE, ARM XI

## (undated) DEVICE — DRAPE, SHEET, U, W/ADHESIVE STRIP, IMPERVIOUS, 60 X 70 IN, DISPOSABLE, LF, STERILE

## (undated) DEVICE — STAPLER, SKIN, PLUS, WIDE, 35

## (undated) DEVICE — BANDAGE, ESMARK 4 IN X 9 FT, STERILE

## (undated) DEVICE — SUTURE, MONOCRYLIC, 4-0, P3, MONO 18

## (undated) DEVICE — SLING, ARM, LARGE

## (undated) DEVICE — GLOVE, SURGICAL, PROTEXIS PI , 7.0, PF, LF

## (undated) DEVICE — BANDAGE, ESMARK, 6 IN X 9 FT, STERILE

## (undated) DEVICE — DRAPE, MINI C-ARM, 54 X 78

## (undated) DEVICE — GLOVE, SURGICAL, PROTEXIS PI BLUE W/NEUTHERA, 7.5, PF, LF

## (undated) DEVICE — STOCKINETTE, DOUBLE PLY, 4 X 48 IN, STERILE

## (undated) DEVICE — DRAPE, SHEET, UTILITY, NON ABSORBENT, 18 X 26 IN, LF

## (undated) DEVICE — SUTURE, VICRYL PLUS 3-0, SH, 27IN

## (undated) DEVICE — SUTURE, VICRYL PLUS, 2-0, SH, 1X27IN, UNDYED

## (undated) DEVICE — SCISSORS, MONOPOLAR, CURVED, 8MM

## (undated) DEVICE — CAUTERY, PENCIL, PUSH BUTTON, SMOKE EVAC, 70MM

## (undated) DEVICE — PACK, BASIC

## (undated) DEVICE — COVER, BACK TABLE, 65 X 90, HVY REINFORCED

## (undated) DEVICE — DRAPE KIT, C-ARM, W/ PLATE & FOOT, DISP

## (undated) DEVICE — SUTURE, MONOCRYL, 4-0, 27 IN, PS-2, UNDYED

## (undated) DEVICE — PADDING, UNDERCAST, WEBRIL, 3 IN X 4 YD, REG, NS

## (undated) DEVICE — APPLICATOR, PREP, CHLORAPREP, W/ORANGE TINT, 10.5ML

## (undated) DEVICE — DRAPE, SHEET, THREE QUARTER, FAN FOLD, 57 X 77 IN

## (undated) DEVICE — WOUND SYSTEM, DEBRIDEMENT & CLEANING, O.R DUOPAK

## (undated) DEVICE — DRAPE, TIBURON W/ADHESIVE, 19 X 30

## (undated) DEVICE — DRIVER, NEEDLE, MEGA SUTURE CUT, DAVINCI XI

## (undated) DEVICE — GLOVE, SURGICAL, PROTEXIS PI , 7.5, PF, LF

## (undated) DEVICE — SUTURE, ETHIBOND, XTRA, 30 IN, 2-0, CT-2, GREEN

## (undated) DEVICE — IRRIGATOR, WOUND, HYDRO SURG PLUS, W/O TIP, DISP

## (undated) DEVICE — DRESSING, GAUZE, SPONGE, KERLIX, SUPER, 6 X 6.75 IN, STERILE 10PK

## (undated) DEVICE — SUTURE, V-LOC, 2-0, 180 GR9 GS-21

## (undated) DEVICE — OBTURATOR, BLADELESS , SU

## (undated) DEVICE — LUBRICANT, ELECTROLUBE, F/ELECTRODE TIPS

## (undated) DEVICE — POSITIONING SYSTEM, PAGAZZI, PATIENT

## (undated) DEVICE — PADDING, WEBRIL, UNDERCAST, STERILE, 4 IN

## (undated) DEVICE — TUBING SET, TRI-LUMEN, FILTERED, F/AIRSEAL

## (undated) DEVICE — MANIPULATOR, ADVINCULA DELINEATOR, 3.0CM

## (undated) DEVICE — SUTURE, SURGILON, 4-0, 18, WHITE, P-12      "

## (undated) DEVICE — CATHETER TRAY, URETHRAL, FOLEY, 16 FR, SILICONE

## (undated) DEVICE — DRESSING, GAUZE, PETROLATUM, PATCH, XEROFORM, 1 X 8 IN, STERILE

## (undated) DEVICE — CORD, CAUTERY, BIPOLAR, STERILE

## (undated) DEVICE — GLOVE, SURGICAL, PROTEXIS PI MICRO, 7.5, PF, LF

## (undated) DEVICE — DRAPE, SHEET, HAND, GUSSETTED, W/TABLE EXTENSION, 77 X 146 IN, DISPOSABLE, LF, STERILE

## (undated) DEVICE — NEEDLE, HYPODERMIC, REGULAR WALL, REGULAR BEVEL, 18 G X 1.5 IN

## (undated) DEVICE — BANDAGE, COFLEX, 4 X 5 YDS, FOAM TAN, STERILE, LF

## (undated) DEVICE — BANDAGE, ELASTIC, SELF-CLOSE, 4 IN, HONEYCOMB, STERILE

## (undated) DEVICE — SUTURE, ETHILON, 3-0, 18 IN, PS1, BLACK